# Patient Record
Sex: MALE | Race: WHITE | Employment: OTHER | URBAN - METROPOLITAN AREA
[De-identification: names, ages, dates, MRNs, and addresses within clinical notes are randomized per-mention and may not be internally consistent; named-entity substitution may affect disease eponyms.]

---

## 2017-05-05 ENCOUNTER — HOSPITAL ENCOUNTER (EMERGENCY)
Age: 37
Discharge: HOME | End: 2017-05-05
Attending: EMERGENCY MEDICINE
Payer: OTHER MISCELLANEOUS

## 2017-05-05 ENCOUNTER — APPOINTMENT (OUTPATIENT)
Dept: GENERAL RADIOLOGY | Age: 37
End: 2017-05-05
Payer: OTHER MISCELLANEOUS

## 2017-05-05 VITALS
WEIGHT: 180 LBS | DIASTOLIC BLOOD PRESSURE: 82 MMHG | SYSTOLIC BLOOD PRESSURE: 146 MMHG | HEIGHT: 72 IN | OXYGEN SATURATION: 97 % | BODY MASS INDEX: 24.38 KG/M2 | TEMPERATURE: 97 F | RESPIRATION RATE: 18 BRPM | HEART RATE: 107 BPM

## 2017-05-05 DIAGNOSIS — M25.561 ACUTE PAIN OF RIGHT KNEE: ICD-10-CM

## 2017-05-05 DIAGNOSIS — S93.104A TOE DISLOCATION, RIGHT, INITIAL ENCOUNTER: Primary | ICD-10-CM
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
## 2017-05-05 DIAGNOSIS — M54.9 UPPER BACK PAIN: ICD-10-CM

## 2017-05-05 PROCEDURE — 28495 TREAT BIG TOE FRACTURE: CPT

## 2017-05-05 PROCEDURE — 73562 X-RAY EXAM OF KNEE 3: CPT

## 2017-05-05 PROCEDURE — 72072 X-RAY EXAM THORAC SPINE 3VWS: CPT

## 2017-05-05 PROCEDURE — 73630 X-RAY EXAM OF FOOT: CPT

## 2017-05-05 PROCEDURE — 6360000002 HC RX W HCPCS

## 2017-05-05 PROCEDURE — 99284 EMERGENCY DEPT VISIT MOD MDM: CPT

## 2017-05-05 PROCEDURE — 96372 THER/PROPH/DIAG INJ SC/IM: CPT

## 2017-05-05 RX ORDER — MORPHINE SULFATE 4 MG/ML
INJECTION, SOLUTION INTRAMUSCULAR; INTRAVENOUS
Status: COMPLETED
Start: 2017-05-05 | End: 2017-05-05

## 2017-05-05 RX ORDER — CYCLOBENZAPRINE HCL 10 MG
10 TABLET ORAL 3 TIMES DAILY PRN
Qty: 30 TABLET | Refills: 0 | Status: SHIPPED | OUTPATIENT
Start: 2017-05-05 | End: 2017-05-15

## 2017-05-05 RX ORDER — MORPHINE SULFATE 4 MG/ML
4 INJECTION, SOLUTION INTRAMUSCULAR; INTRAVENOUS ONCE
Status: COMPLETED | OUTPATIENT
Start: 2017-05-05 | End: 2017-05-05

## 2017-05-05 RX ORDER — MORPHINE SULFATE 4 MG/ML
4 INJECTION, SOLUTION INTRAMUSCULAR; INTRAVENOUS ONCE
Status: DISCONTINUED | OUTPATIENT
Start: 2017-05-05 | End: 2017-05-05

## 2017-05-05 RX ORDER — OXYCODONE HYDROCHLORIDE AND ACETAMINOPHEN 5; 325 MG/1; MG/1
1 TABLET ORAL EVERY 6 HOURS PRN
Qty: 5 TABLET | Refills: 0 | Status: SHIPPED | OUTPATIENT
Start: 2017-05-05 | End: 2017-05-12

## 2017-05-05 RX ORDER — LIDOCAINE HYDROCHLORIDE 10 MG/ML
20 INJECTION, SOLUTION INFILTRATION; PERINEURAL ONCE
Status: DISCONTINUED | OUTPATIENT
Start: 2017-05-05 | End: 2017-05-06 | Stop reason: HOSPADM

## 2017-05-05 RX ADMIN — MORPHINE SULFATE 4 MG: 4 INJECTION, SOLUTION INTRAMUSCULAR; INTRAVENOUS at 21:38

## 2017-05-05 ASSESSMENT — PAIN DESCRIPTION - PAIN TYPE: TYPE: ACUTE PAIN

## 2017-05-05 ASSESSMENT — PAIN SCALES - GENERAL
PAINLEVEL_OUTOF10: 8
PAINLEVEL_OUTOF10: 9

## 2017-05-05 ASSESSMENT — ENCOUNTER SYMPTOMS
SHORTNESS OF BREATH: 0
VOMITING: 0
ABDOMINAL PAIN: 0
WHEEZING: 0
BACK PAIN: 1
NAUSEA: 0

## 2017-05-05 ASSESSMENT — PAIN DESCRIPTION - LOCATION: LOCATION: ANKLE

## 2020-06-25 ENCOUNTER — OFFICE VISIT (OUTPATIENT)
Dept: FAMILY MEDICINE CLINIC | Age: 40
End: 2020-06-25
Payer: MEDICAID

## 2020-06-25 VITALS
DIASTOLIC BLOOD PRESSURE: 73 MMHG | SYSTOLIC BLOOD PRESSURE: 110 MMHG | WEIGHT: 163 LBS | TEMPERATURE: 97.2 F | HEIGHT: 70 IN | HEART RATE: 86 BPM | BODY MASS INDEX: 23.34 KG/M2

## 2020-06-25 PROCEDURE — G8427 DOCREV CUR MEDS BY ELIG CLIN: HCPCS | Performed by: STUDENT IN AN ORGANIZED HEALTH CARE EDUCATION/TRAINING PROGRAM

## 2020-06-25 PROCEDURE — G8420 CALC BMI NORM PARAMETERS: HCPCS | Performed by: STUDENT IN AN ORGANIZED HEALTH CARE EDUCATION/TRAINING PROGRAM

## 2020-06-25 PROCEDURE — 1036F TOBACCO NON-USER: CPT | Performed by: STUDENT IN AN ORGANIZED HEALTH CARE EDUCATION/TRAINING PROGRAM

## 2020-06-25 PROCEDURE — 99204 OFFICE O/P NEW MOD 45 MIN: CPT | Performed by: STUDENT IN AN ORGANIZED HEALTH CARE EDUCATION/TRAINING PROGRAM

## 2020-06-25 ASSESSMENT — ENCOUNTER SYMPTOMS
ABDOMINAL PAIN: 0
VOMITING: 0
CHEST TIGHTNESS: 0
SHORTNESS OF BREATH: 0
SORE THROAT: 0
NAUSEA: 0
CONSTIPATION: 0
COUGH: 0
DIARRHEA: 0

## 2020-06-25 ASSESSMENT — PATIENT HEALTH QUESTIONNAIRE - PHQ9
2. FEELING DOWN, DEPRESSED OR HOPELESS: 0
SUM OF ALL RESPONSES TO PHQ9 QUESTIONS 1 & 2: 0
SUM OF ALL RESPONSES TO PHQ QUESTIONS 1-9: 0
SUM OF ALL RESPONSES TO PHQ QUESTIONS 1-9: 0
1. LITTLE INTEREST OR PLEASURE IN DOING THINGS: 0

## 2020-06-25 NOTE — PROGRESS NOTES
Attending Physician Statement  I have discussed the care of Rogerio Guerra pertinent history and exam findings,  with the resident. I have reviewed the key elements of all parts of the encounter with the resident. I agree with the assessment, plan and orders as documented by the resident.   (GE Modifier)    NP to establish care  Sleep disorder- Sleep medicine referral/sleep study Columba Loser diary
Visit Information    Have you changed or started any medications since your last visit including any over-the-counter medicines, vitamins, or herbal medicines? no   Have you stopped taking any of your medications? Is so, why? -  no  Are you having any side effects from any of your medications? - no    Have you seen any other physician or provider since your last visit?  no   Have you had any other diagnostic tests since your last visit?  no   Have you been seen in the emergency room and/or had an admission in a hospital since we last saw you?  no   Have you had your routine dental cleaning in the past 6 months?  no     Do you have an active MyChart account? If no, what is the barrier?   No:     Patient Care Team:  Nancy Chanel MD as PCP - General (Family Medicine)    Medical History Review  Past Medical, Family, and Social History reviewed and does not contribute to the patient presenting condition    Health Maintenance   Topic Date Due    Varicella vaccine (1 of 2 - 2-dose childhood series) 03/03/1982    HIV screen  03/03/1996    DTaP/Tdap/Td vaccine (1 - Tdap) 03/03/2000    Flu vaccine (Season Ended) 09/01/2020    Hepatitis A vaccine  Aged Out    Hepatitis B vaccine  Aged Out    Hib vaccine  Aged Out    Meningococcal (ACWY) vaccine  Aged Out    Pneumococcal 0-64 years Vaccine  Aged Out
Autism spectrum  -Patient not on medications          Requested Prescriptions      No prescriptions requested or ordered in this encounter       There are no discontinued medications. Return in about 4 weeks (around 7/23/2020). Afshan Heredia received counseling on the following healthy behaviors: nutrition and exercise  Reviewed prior labs and health maintenance  Continue current medications, diet and exercise. Discussed use, benefit, and side effects of prescribed medications. Barriers to medication compliance addressed. Patient given educational materials - see patient instructions  Was a self-tracking handout given in paper form or via App[employer]? No:     Requested Prescriptions      No prescriptions requested or ordered in this encounter       All patient questions answered. Patient voiced understanding. Quality Measures    Body mass index is 23.39 kg/m². Elevated. Weight control planned discussed Healthy diet and regular exercise. BP: 110/73 Blood pressure is normal. Treatment plan consists of No treatment change needed.     No results found for: LDLCALC, LDLCHOLESTEROL, LDLDIRECT (goal LDL reduction with dx if diabetes is 50% LDL reduction)      PHQ Scores 6/25/2020   PHQ2 Score 0   PHQ9 Score 0     Interpretation of Total Score Depression Severity: 1-4 = Minimal depression, 5-9 = Mild depression, 10-14 = Moderate depression, 15-19 = Moderately severe depression, 20-27 = Severe depression

## 2020-09-11 ENCOUNTER — OFFICE VISIT (OUTPATIENT)
Dept: FAMILY MEDICINE CLINIC | Age: 40
End: 2020-09-11
Payer: MEDICAID

## 2020-09-11 VITALS
BODY MASS INDEX: 22.9 KG/M2 | HEART RATE: 82 BPM | TEMPERATURE: 97.1 F | WEIGHT: 160 LBS | DIASTOLIC BLOOD PRESSURE: 77 MMHG | HEIGHT: 70 IN | SYSTOLIC BLOOD PRESSURE: 123 MMHG

## 2020-09-11 PROCEDURE — G8420 CALC BMI NORM PARAMETERS: HCPCS | Performed by: STUDENT IN AN ORGANIZED HEALTH CARE EDUCATION/TRAINING PROGRAM

## 2020-09-11 PROCEDURE — 1036F TOBACCO NON-USER: CPT | Performed by: STUDENT IN AN ORGANIZED HEALTH CARE EDUCATION/TRAINING PROGRAM

## 2020-09-11 PROCEDURE — 99213 OFFICE O/P EST LOW 20 MIN: CPT | Performed by: STUDENT IN AN ORGANIZED HEALTH CARE EDUCATION/TRAINING PROGRAM

## 2020-09-11 PROCEDURE — G8427 DOCREV CUR MEDS BY ELIG CLIN: HCPCS | Performed by: STUDENT IN AN ORGANIZED HEALTH CARE EDUCATION/TRAINING PROGRAM

## 2020-09-11 ASSESSMENT — ENCOUNTER SYMPTOMS
CONSTIPATION: 0
CHEST TIGHTNESS: 0
DIARRHEA: 0
ABDOMINAL PAIN: 0
COUGH: 0
SORE THROAT: 0
SHORTNESS OF BREATH: 0
NAUSEA: 0
VOMITING: 0

## 2020-09-11 NOTE — PROGRESS NOTES
Subjective: Francisco Carrillo is a 45 y.o. male with  has a past medical history of Asthma. No family history on file. Presented tothe office today for:  Chief Complaint   Patient presents with    Shoulder Pain       HPI     Location: left knee pain   Intensity: 5/10  Quality: dull, achy  Radiation:None   Alleviating: knee wraps  Aggravating: walking  Associated: None  Treatments: compression wraps and ice      Location: low back   Intensity: 7/10  Quality: sharp  Radiation:none  Alleviating: position and sleeping on a seated chair  Aggravating: melatonin  Associated: none  Treatments: aleve and heating pad      Review of Systems   Constitutional: Negative for chills, fatigue, fever and unexpected weight change. HENT: Negative for congestion, mouth sores and sore throat. Eyes: Negative for visual disturbance. Respiratory: Negative for cough, chest tightness and shortness of breath. Cardiovascular: Negative for chest pain and leg swelling. Gastrointestinal: Negative for abdominal pain, constipation, diarrhea, nausea and vomiting. Genitourinary: Negative for difficulty urinating. Musculoskeletal: Negative for joint swelling. Knee pain   Skin: Negative for rash. Neurological: Negative for dizziness, weakness and headaches. Objective:    /77 (Site: Right Upper Arm, Position: Sitting, Cuff Size: Medium Adult)   Pulse 82   Temp 97.1 °F (36.2 °C) (Temporal)   Ht 5' 10\" (1.778 m)   Wt 160 lb (72.6 kg)   BMI 22.96 kg/m²    BP Readings from Last 3 Encounters:   09/11/20 123/77   06/25/20 110/73   05/05/17 (!) 146/82     Physical Exam  Vitals signs and nursing note reviewed. Constitutional:       Appearance: He is well-developed. Cardiovascular:      Rate and Rhythm: Normal rate and regular rhythm. Heart sounds: Normal heart sounds. No murmur. No friction rub. No gallop. Pulmonary:      Effort: Pulmonary effort is normal. No respiratory distress.       Breath sounds: Normal breath sounds. No wheezing or rales. Chest:      Chest wall: No tenderness. Abdominal:      General: Bowel sounds are normal. There is no distension. Palpations: Abdomen is soft. There is no mass. Tenderness: There is no abdominal tenderness. There is no guarding. Musculoskeletal:      Comments:   Knee Exam  Musculoskeletal/Neurologic:  Inspection-Swelling: none, Ecchymosis: no  Palpation-Tenderness:none  Pain with patellar grind: no  ROM-normal  Strength- WNL  Sensation-normal to light touch    Special Tests-  Varus Laxity: negative   Valgus Laxity:  negative   Anterior Drawer: negative   Posterior Drawer: negative  Lachman's: negative  Adali's:negative  Thessaly: negative    Single Leg Squat: Negative  Deep Squat: Negative  Gait: normal    PSYCH:  Good fund of knowledge and displays understanding of exam.      Palpation - Tenderness: no  ROM - normal  Strength- WNL  Sensation -WNL  Reflexes - WNL  SLR: negative  Randy: negative  Leg length discrepancy: none    Gait: normal    PSYCH:  Good fund of knowledge and displays understanding of exam.   Skin:     Capillary Refill: Capillary refill takes less than 2 seconds. Neurological:      Mental Status: He is alert and oriented to person, place, and time. No results found for: WBC, HGB, HCT, PLT, CHOL, TRIG, HDL, LDLDIRECT, ALT, AST, NA, K, CL, CREATININE, BUN, CO2, TSH, PSA, INR, GLUF, LABA1C, LABMICR  No results found for: CALCIUM, PHOS  No results found for: LDLCALC, LDLCHOLESTEROL, LDLDIRECT    Assessment and Plan:    1. Chronic pain of left knee  - [facility]    2. Acute low back pain without sciatica, unspecified back pain laterality  -[facility]      Requested Prescriptions      No prescriptions requested or ordered in this encounter       There are no discontinued medications. Return in about 3 months (around 12/11/2020).       Rita Blair received counseling on the

## 2020-09-24 ENCOUNTER — HOSPITAL ENCOUNTER (OUTPATIENT)
Dept: PHYSICAL THERAPY | Age: 40
Setting detail: THERAPIES SERIES
Discharge: HOME | End: 2020-09-24
Payer: MEDICAID

## 2020-09-24 PROCEDURE — 97163 PT EVAL HIGH COMPLEX 45 MIN: CPT
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
## 2020-09-28 NOTE — PRE-CERTIFICATION NOTE
[x] [facility] - [facility] &  Therapy  [address]  [phone]  F: [phone] [] [address]  [facility]   [address]  P: [phone]  F: [phone] [] [place]  [address]  P: [phone]  F: [phone] [] [address]  [facility]   [address]   [place]: [phone]  F: [phone]  [x] Anabel Tejeda   Outpatient Occupational Therapy  [address]: [phone]  F: [phone]          Therapy Pre-certification Note      9/28/2020    Bonnie Conde [facility]  1980   [id number]      Insurance approval was received for Physical Therapy from [facility] on 9/28/20. Approval was received for 72 units = 18 visits, from 10/1/20 to 12/30/20. Authorization number [id number].    [id number]  [id number]  [id number]  [id number]  [id number]  [id number]  [id number]    72 units for all = 18 visits. 10/1/20-12/30/20. Patient was contacted to be scheduled and is coming 9/29/20 per his request.    Mackenzie Anton left message for Wadell Racer at [phone] to request updated auth dates. Update: approval to change auth date to begin on 9/29/20 was received at 1:52pm by Dyeanira.       Electronically signed by Mackenzie Lozano PT on 9/28/2020 at 1:46 PM

## 2020-09-29 ENCOUNTER — HOSPITAL ENCOUNTER (OUTPATIENT)
Dept: PHYSICAL THERAPY | Age: 40
Setting detail: THERAPIES SERIES
Discharge: HOME | End: 2020-09-29
Payer: MEDICAID

## 2020-09-29 PROCEDURE — 97110 THERAPEUTIC EXERCISES: CPT

## 2020-09-29 NOTE — FLOWSHEET NOTE
5/5  4. ? Function:  UEFI 6% loss of UE function, LEFS 24% loss of LE function  5. Pt report no pain w/ascending, descending steps     Patient goals: No pain, no difficulty w/steps    Pt. Education:  [x] Yes  [] No  [] Reviewed Prior HEP/Ed  Method of Education: [] Verbal  [x] Demo  [] Written  Comprehension of Education:  [x] Verbalizes understanding. [] Demonstrates understanding. [x] Needs review. [] Demonstrates/verbalizes HEP/Ed previously given. Plan: [x] Continue current frequency toward long and short term goals.     [x] Specific Instructions for subsequent treatments:  quad strengthening, scap stab exercises, add game ready knee to decrease pain and edema, OK for ionto or US to decrease inflammation, consider taping         Time In: 1500            Time Out: [address] [facility]      Electronically signed by:  Gabo Woodard PTA

## 2020-09-30 ENCOUNTER — HOSPITAL ENCOUNTER (OUTPATIENT)
Dept: PHYSICAL THERAPY | Age: 40
Setting detail: THERAPIES SERIES
Discharge: HOME | End: 2020-09-30
Payer: MEDICAID

## 2020-09-30 PROCEDURE — 97110 THERAPEUTIC EXERCISES: CPT

## 2020-09-30 NOTE — FLOWSHEET NOTE
[x] [facility] &  Therapy  [address]  [phone]  F: [phone]     Physical Therapy Daily Treatment Note    Date:  2020  Patient Name:  Carie Owens      :  1980    MRN: [id number]  Physician: Warren Rebolledo, Irma Solis, Kayla Becerra                             Insurance: [facility]   Approval was received for 72 units = 18 visits, from 10/1/20 to 20. Authorization number [id number].   [id number]  [id number]  [id number]  [id number]  [id number]  [id number]  [id number]  Medical Diagnosis: Winged L scapula M95.8, Chronic pain L knee M25.562, G89.29                    Rehab Codes: W16.591, M25.562, M25.612, M25.662, M25.462,M54.5, R29.3, M62.51  Onset Date: 2019                                   Next 's appt: unknown    Visit# / total visits: 3/18     Cancels/No Shows: 0/0    Subjective:    Pain:  [x] Yes  [] No Location: L scapula, L lateral knee   Pain Rating: (0-10 scale) 3/10 -- L scap  Pain altered Tx:  [x] No  [] Yes  Action:  Comments: Patient states with usual scapula pain this date at arrival. Notes no increased pain or soreness in LE from last session.       Objective:  Modalities: ionto, vaso prn  Precautions:  Exercises:   Exercise Reps/ Time Weight/ Level Comments   SciFit prn  Pt rides bicycle to clinic         Pulleys DC  Flexion/abductoin -          Standing      Bands: increase band next TX   Increased reps    - Pull Down 20x Lime [place]    - Rows 20x Lime [place]    - Protraction 20x Lime [place]    - Horizontal abduction 20x [place] Bilaterally - simultaneously   - ER 20x Lime Green Bilaterally - simultaneously         Scapular Stabilization on Wall 15x 2.2 lb Up/down, side/side, CW/CCW  Ensure only scapular motion - no distal chain activity         Gastroc stretch 3x30\"     HS stretch 3x30\"  Doni   Power step Ups 20x 12\" Drive opp knee up    Heel Taps 20x 4\" Fwd   TKE 20x5\" Plum    3 way hip  15x Blue           Seated   Doni   LAQ 20x 2 lb    HS Curls 20x 2 lb  Lime Green          Supine   Doni   SAQ 20x 2 lb    SLR 20x 2 lb    Piriformis stretch 3x20\"  Figure 4 supine and seated         Prone   Next   Hip ext      Shoulder rows      Shoulder ext       Other:    Manual: - add** foam rolling to lateral thigh/IT band; manual or self piriformis stretching     Instructions for Next Tx:  quad strengthening, scap stab exercises, add game ready knee to decrease pain and edema, OK for ionto to decrease inflammation, consider taping      Treatment Charges: Mins Units   []  Modalities     [x]  Ther Exercise 45 3   []  Manual Therapy     []  Ther Activities     []  Aquatics     []  Vasocompression     []  Other     Total Treatment time 45 3       Assessment: [x] Progressing toward goals: Added posterior chain stretches as patient notes onset of minimal fatigue and low back pain during standing exs, relief post stretching. Modified step ups to power step ups to improved doni LE strength and stability with patient noting no pain in knee or issues. Patient with increased tension in hips noted at last treatment, added piriformis stretches in seated and supine to address this date. Patient verbalized adequate stretch in appropriate musculature. Issued written handout and lime tband for supine exs, 3 way hip, and piriformis stretches this date with good verbal understanding for use. [] No change. [] Other:  [x] Patient would benefit from skilled physical therapy services in order to: decrease pain, increase strength and ROM, and increase functional mobility, including tolerance to steps. STG: (to be met in 10 treatments)  1. ? Pain: L shoulder, L knee 2/10 average pain, 7/10 at worst  2. ? ROM: L knee ext 5°; L Shoulder flex 166°, IR 76° (@ 90° abd)  3. ? Strength: L shoulder 4-/5, L hip 4-/5, L knee flex 4+/5, ext 5/5  4. ? Function:  UEFI 10% loss of UE function, LEFS 36% loss of LE function  5. Pt report knee no longer feel weak when walking  6.  Pt report decreased frequency of knee edema  7. Patient to be independent with home exercise program as demonstrated by performance with correct form without cues.     LTG: (to be met in 18 treatments)  1. ? Pain: L shoulder, L knee 0/10 average pain, 4/10 at worst  2. ? ROM: L knee ext 2°; L Shoulder IR 82° (@ 90° abd)  3. ? Strength: L shoulder 4/5, L hip 4/5, L knee 5/5  4. ? Function:  UEFI 6% loss of UE function, LEFS 24% loss of LE function  5. Pt report no pain w/ascending, descending steps     Patient goals: No pain, no difficulty w/steps    Pt. Education:  [x] Yes  [] No  [] Reviewed Prior HEP/Ed  Method of Education: [] Verbal  [x] Demo  [x] Written  Comprehension of Education:  [x] Verbalizes understanding. [] Demonstrates understanding. [x] Needs review. [] Demonstrates/verbalizes HEP/Ed previously given. 9/30/20: 3 way hip (and lime tband), piriformis stretches, SLR, SAQ, LAQ     Plan: [x] Continue current frequency toward long and short term goals.     [x] Specific Instructions for subsequent treatments:  quad strengthening, scap stab exercises, add game ready knee to decrease pain and edema, OK for ionto or US to decrease inflammation, consider taping         Time In: 345 pm            Time Out: 450 pm      Electronically signed by:  Rene Baird PTA

## 2020-10-05 ENCOUNTER — APPOINTMENT (OUTPATIENT)
Dept: PHYSICAL THERAPY | Age: 40
End: 2020-10-05
Payer: MEDICAID

## 2020-10-07 ENCOUNTER — APPOINTMENT (OUTPATIENT)
Dept: PHYSICAL THERAPY | Age: 40
End: 2020-10-07
Payer: MEDICAID

## 2020-10-09 ENCOUNTER — HOSPITAL ENCOUNTER (OUTPATIENT)
Dept: PHYSICAL THERAPY | Age: 40
Setting detail: THERAPIES SERIES
Discharge: HOME | End: 2020-10-09
Payer: MEDICAID

## 2020-10-09 PROCEDURE — 97110 THERAPEUTIC EXERCISES: CPT

## 2020-10-09 NOTE — FLOWSHEET NOTE
[x] [facility] &  Therapy  [address]  [phone]  F: [phone]     Physical Therapy Daily Treatment Note    Date:  10/9/2020  Patient Name:  Cesilia Boswell      :  1980    MRN: [id number]  Physician: Elise Goddard, Eboni Subramanian, Cher Oakes                             Insurance: [facility]   Approval was received for 72 units = 18 visits, from 10/1/20 to 20. Authorization number [id number].   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  Reports no pain in knee upon arrival.      Objective:  Modalities: ionto, vaso prn  Precautions:  Exercises:   Exercise Reps/ Time Weight/ Level Comments   SciFit prn  Pt rides bicycle to clinic         Pulleys DC  Flexion/abductoin -          Standing      Bands: increase band next TX   Increased reps    - Pull Down 20x Lime Green    - Rows 20x Blueberry    - Protraction 20x Plum    - Horizontal abduction 20x Blueberry Bilaterally - simultaneously   - ER 20x Blueberry Bilaterally - simultaneously         Scapular Stabilization on Wall 15x 2.2 lb Up/down, side/side, CW/CCW  Ensure only scapular motion - no distal chain activity         Gastroc stretch 3x30\"     HS stretch 3x30\"  Doni   Power step Ups 20x 12\" Drive opp knee up    Heel Taps 20x 4\" Fwd   TKE 20x5\" Plum    3 way hip  15x Blue           Seated   Doni   LAQ 20x 2 lb    HS Curls 20x 2 lb  Lime Green Supine   Doni   SAQ 20x 2 lb    SLR 20x 2 lb    Piriformis stretch 3x20\"  Figure 4 supine and seated         Prone on TG      Flexion 15x Add weight    Horizontal  15x Add weight    Extension 15x 2 lb    Rows - Wide  15x 2 lb Elbows away from sides         Other:    Manual: - add** foam rolling to lateral thigh/IT band; manual or self piriformis stretching     Instructions for Next Tx:  quad strengthening, scap stab exercises, add game ready knee to decrease pain and edema, OK for ionto to decrease inflammation, consider taping      Treatment Charges: Mins Units   []  Modalities     [x]  Ther Exercise 45 3   []  Manual Therapy     []  Ther Activities     []  Aquatics     []  Vasocompression     []  Other     Total Treatment time 45 3       Assessment: [x] Progressing toward goals: added prone scapular ex's to improve scapular strength in posterior shoulder. Fair tolerance with this. Added planks as well for general core strength and scapular stabilization. Weakness noted with this and pt educated to incorporate these into his daily calisthenics he does independently. [] No change. [] Other:  [x] Patient would benefit from skilled physical therapy services in order to: decrease pain, increase strength and ROM, and increase functional mobility, including tolerance to steps. STG: (to be met in 10 treatments)  1. ? Pain: L shoulder, L knee 2/10 average pain, 7/10 at worst  2. ? ROM: L knee ext 5°; L Shoulder flex 166°, IR 76° (@ 90° abd)  3. ? Strength: L shoulder 4-/5, L hip 4-/5, L knee flex 4+/5, ext 5/5  4. ? Function:  UEFI 10% loss of UE function, LEFS 36% loss of LE function  5. Pt report knee no longer feel weak when walking  6. Pt report decreased frequency of knee edema  7.  Patient to be independent with home exercise program as demonstrated by performance with correct form without cues.     LTG: (to be met in 18 treatments)  1. ? Pain: L shoulder, L knee 0/10 average pain, 4/10 at

## 2020-10-12 ENCOUNTER — HOSPITAL ENCOUNTER (OUTPATIENT)
Dept: PHYSICAL THERAPY | Age: 40
Setting detail: THERAPIES SERIES
Discharge: HOME | End: 2020-10-12
Payer: MEDICAID

## 2020-10-12 PROCEDURE — 97110 THERAPEUTIC EXERCISES: CPT

## 2020-10-12 PROCEDURE — 97140 MANUAL THERAPY 1/> REGIONS: CPT

## 2020-10-12 NOTE — FLOWSHEET NOTE
[x] [facility] &  Therapy  [address]  [phone]  F: [phone]     Physical Therapy Daily Treatment Note    Date:  10/12/2020  Patient Name:  Dom Morales      :  1980    MRN: [id number]  Physician: Bobbi Basilio Francia Hose                             Insurance: [facility]   Approval was received for 72 units = 18 visits, from 10/1/20 to 20. Authorization number [id number].  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 Notes riding his Tricycle here again with no issues.       Objective:  Modalities: ionto, vaso prn  Precautions:  Exercises:   Exercise Reps/ Time Weight/ Level Comments   SciFit prn  Pt rides bicycle to clinic         Standing      Bands: increase band next TX   Increased reps    - Pull Down 20x Lime Green    - Rows 20x Blueberry    - Protraction 20x Plum    - Horizontal abduction 20x Blueberry Bilaterally - simultaneously   - ER 20x Blueberry Bilaterally - simultaneously         Scapular Stabilization on Wall 15x 2.2 lb Up/down, side/side, CW/CCW  Ensure only scapular motion - no distal chain activity         Gastroc stretch 3x30\"     HS stretch 3x30\"  Doni   Power step Ups 20x 12\" Drive opp knee up    Heel Taps HOLD  Hold - unable to perform due to poor ankle mobility   TKE 20x5\" [place]    3 way hip  15x Blue     Lunges 15x ea           Seated   Doni   LAQ 20x 2 lb longer feel weak when walking  6. Pt report decreased frequency of knee edema  7. Patient to be independent with home exercise program as demonstrated by performance with correct form without cues.     LTG: (to be met in 18 treatments)  1. ? Pain: L shoulder, L knee 0/10 average pain, 4/10 at worst  2. ? ROM: L knee ext 2°; L Shoulder IR 82° (@ 90° abd)  3. ? Strength: L shoulder 4/5, L hip 4/5, L knee 5/5  4. ? Function:  UEFI 6% loss of UE function, LEFS 24% loss of LE function  5. Pt report no pain w/ascending, descending steps     Patient goals: No pain, no difficulty w/steps    Pt. Education:  [x] Yes  [] No  [] Reviewed Prior HEP/Ed  Method of Education: [] Verbal  [x] Demo  [x] Written  Comprehension of Education:  [x] Verbalizes understanding. [] Demonstrates understanding. [x] Needs review. [] Demonstrates/verbalizes HEP/Ed previously given. 9/30/20: 3 way hip (and lime tband), piriformis stretches, SLR, SAQ, LAQ     Plan: [x] Continue current frequency toward long and short term goals.     [x] Specific Instructions for subsequent treatments:  quad strengthening, scap stab exercises, add game ready knee to decrease pain and edema, OK for ionto or US to decrease inflammation, consider taping         Time In: 0907             Time Out: 1010      Electronically signed by:  Mervat Lacy PTA

## 2020-10-14 ENCOUNTER — APPOINTMENT (OUTPATIENT)
Dept: PHYSICAL THERAPY | Age: 40
End: 2020-10-14
Payer: MEDICAID

## 2020-10-14 ENCOUNTER — HOSPITAL ENCOUNTER (OUTPATIENT)
Dept: PHYSICAL THERAPY | Age: 40
Setting detail: THERAPIES SERIES
Discharge: HOME | End: 2020-10-14
Payer: MEDICAID

## 2020-10-14 PROCEDURE — 97110 THERAPEUTIC EXERCISES: CPT

## 2020-10-14 PROCEDURE — 97140 MANUAL THERAPY 1/> REGIONS: CPT

## 2020-10-14 NOTE — FLOWSHEET NOTE
[x] [facility]) [facility] &  Therapy  [address]  [phone]  F: [phone]     Physical Therapy Daily Treatment Note    Date:  10/14/2020  Patient Name:  Sim Lara      :  1980    MRN: [id number]  Physician: Lindsey Trevino Estanislao Cuba                             Insurance: [facility]   Approval was received for 72 units = 18 visits, from 10/1/20 to 20. Authorization number [id number].  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 States \"however, we only spent 15-20 minutes on my arms last time so we can flip that today\".        Objective:  Modalities: ionto, vaso prn  Precautions:  Exercises:   Exercise Reps/ Time Weight/ Level Comments    SciFit prn  Pt rides bicycle to clinic           Standing       Bands: increase band next TX   Increased reps     - Pull Down 20x Lime Green  x   - Rows 20x Blueberry  x   - Protraction 20x Plum  x   - Horizontal abduction 20x Blueberry Bilaterally - simultaneously x   - ER 20x Blueberry Bilaterally - simultaneously x   Doorway ER stretch 3x15\"  Doni x   Corner stretch 3x20\"   x   Scapular Stabilization on Wall 15x 2.2 lb Up/down, side/side, CW/CCW  Ensure only scapular motion - no distal chain activity x   Wall push ups 2x15   x   Reverse wall push up 2x15   x   FW flexion 2x10 2lb Scap retraction x fair carryover. Patient requested \"the stick\" rolling to quads and adductors at conclusion of treatment as significant tension present. Fair release and moderate relief noted post.         [] No change. [] Other:  [x] Patient would benefit from skilled physical therapy services in order to: decrease pain, increase strength and ROM, and increase functional mobility, including tolerance to steps. STG: (to be met in 10 treatments)  1. ? Pain: L shoulder, L knee 2/10 average pain, 7/10 at worst  2. ? ROM: L knee ext 5°; L Shoulder flex 166°, IR 76° (@ 90° abd)  3. ? Strength: L shoulder 4-/5, L hip 4-/5, L knee flex 4+/5, ext 5/5  4. ? Function:  UEFI 10% loss of UE function, LEFS 36% loss of LE function  5. Pt report knee no longer feel weak when walking  6. Pt report decreased frequency of knee edema  7. Patient to be independent with home exercise program as demonstrated by performance with correct form without cues.     LTG: (to be met in 18 treatments)  1. ? Pain: L shoulder, L knee 0/10 average pain, 4/10 at worst  2. ? ROM: L knee ext 2°; L Shoulder IR 82° (@ 90° abd)  3. ? Strength: L shoulder 4/5, L hip 4/5, L knee 5/5  4. ? Function:  UEFI 6% loss of UE function, LEFS 24% loss of LE function  5. Pt report no pain w/ascending, descending steps     Patient goals: No pain, no difficulty w/steps    Pt. Education:  [x] Yes  [] No  [] Reviewed Prior HEP/Ed  Method of Education: [] Verbal  [x] Demo  [x] Written  Comprehension of Education:  [x] Verbalizes understanding. [] Demonstrates understanding. [x] Needs review. [] Demonstrates/verbalizes HEP/Ed previously given. 9/30/20: 3 way hip (and lime tband), piriformis stretches, SLR, SAQ, LAQ     Plan: [x] Continue current frequency toward long and short term goals.     [x] Specific Instructions for subsequent treatments:  quad strengthening, scap stab exercises, add game ready knee to decrease pain and edema, OK for ionto or US to decrease inflammation, consider taping         Time In: 5952             Time Out: 1005 am      Electronically signed by:  Marian Izaguirre PTA

## 2020-10-16 ENCOUNTER — APPOINTMENT (OUTPATIENT)
Dept: PHYSICAL THERAPY | Age: 40
End: 2020-10-16
Payer: MEDICAID

## 2020-10-19 ENCOUNTER — HOSPITAL ENCOUNTER (OUTPATIENT)
Dept: PHYSICAL THERAPY | Age: 40
Setting detail: THERAPIES SERIES
Discharge: HOME | End: 2020-10-19
Payer: MEDICAID

## 2020-10-19 PROCEDURE — 97110 THERAPEUTIC EXERCISES: CPT

## 2020-10-19 NOTE — FLOWSHEET NOTE
[x] [facility] &  Therapy  [address]  [phone]  F: [phone]     Physical Therapy Daily Treatment Note    Date:  10/19/2020  Patient Name:  Carie Owens      :  1980    MRN: [id number]  Physician: Warren Rebolledo, Irma Solis, Kayla Becerra                             Insurance: [facility]   Approval was received for 72 units = 18 visits, from 10/1/20 to 20. Authorization number [id number].  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 Feels he strained L shoulder blade from moving jugs of milk. 8/10 pain noted last night, with no pain rating given.       Objective:  Modalities: ionto, vaso prn  Precautions:  Exercises completed marked with \"X\"   Exercise Reps/ Time Weight/ Level Comments    SciFit prn  Pt rides bicycle to clinic           Standing       Bands: increase band next TX   Increased reps     - Over Head Wide  Row 20x Blueberry  X   - Rows 20x Blueberry  X   - Protraction 20x Plum  X   - Horizontal abduction 20x Blueberry Bilaterally - simultaneously X   - ER 20x Blueberry Bilaterally - simultaneously X   Doorway ER stretch 3x15\"  Doni    Corner stretch 3x20\"   X   Scapular Stabilization on Wall 15x 2.2 lb Up/down, side/side, CW/CCW  Ensure only scapular motion - no distal chain activity X   Wall push ups 2x15   X   Reverse wall push up 2x15      FW flexion 2x10 3 lb Scap retraction  Increased weight states about 7 weeks ago he fell while in a hurry and injured his elbow and wrist, but now is isolated to her radial wrist and thumb. Had pt perform [facility] [place], which was positive. Educated pt on possibility of DeQuervain Symptom and issued educational written hand out: https://orthoinfo. aaos. org/en/diseases--conditions/oe-udozdehgb-krqwmqblpw/      [x] Patient would benefit from skilled physical therapy services in order to: decrease pain, increase strength and ROM, and increase functional mobility, including tolerance to steps. STG: (to be met in 10 treatments)  1. ? Pain: L shoulder, L knee 2/10 average pain, 7/10 at worst  2. ? ROM: L knee ext 5°; L Shoulder flex 166°, IR 76° (@ 90° abd)  3. ? Strength: L shoulder 4-/5, L hip 4-/5, L knee flex 4+/5, ext 5/5  4. ? Function:  UEFI 10% loss of UE function, LEFS 36% loss of LE function  5. Pt report knee no longer feel weak when walking  6. Pt report decreased frequency of knee edema  7. Patient to be independent with home exercise program as demonstrated by performance with correct form without cues.     LTG: (to be met in 18 treatments)  1. ? Pain: L shoulder, L knee 0/10 average pain, 4/10 at worst  2. ? ROM: L knee ext 2°; L Shoulder IR 82° (@ 90° abd)  3. ? Strength: L shoulder 4/5, L hip 4/5, L knee 5/5  4. ? Function:  UEFI 6% loss of UE function, LEFS 24% loss of LE function  5. Pt report no pain w/ascending, descending steps     Patient goals: No pain, no difficulty w/steps    Pt. Education:  [x] Yes  [] No  [] Reviewed Prior HEP/Ed  Method of Education: [] Verbal  [x] Demo  [x] Written  Comprehension of Education:  [x] Verbalizes understanding. [] Demonstrates understanding. [x] Needs review. [] Demonstrates/verbalizes HEP/Ed previously given. 9/30/20: 3 way hip (and lime tband), piriformis stretches, SLR, SAQ, LAQ  https://orthoinfo. aaos. org/en/diseases--conditions/qo-pmgmdukkz-bommfshgso/     Plan: [x] Continue current frequency toward long and short term goals.     [x] Specific Instructions for subsequent treatments:  quad strengthening, scap stab exercises, add game ready knee to decrease pain and edema, OK for ionto or US to decrease inflammation, consider taping         Time In: 1110             Time Out: 1212      Electronically signed by:  Yris Dubon PTA

## 2020-10-21 ENCOUNTER — APPOINTMENT (OUTPATIENT)
Dept: PHYSICAL THERAPY | Age: 40
End: 2020-10-21
Payer: MEDICAID

## 2020-10-23 ENCOUNTER — HOSPITAL ENCOUNTER (OUTPATIENT)
Dept: PHYSICAL THERAPY | Age: 40
Setting detail: THERAPIES SERIES
Discharge: HOME | End: 2020-10-23
Payer: MEDICAID

## 2020-10-23 NOTE — FLOWSHEET NOTE
[x] [facility] &  Therapy  [address]    [phone]  F: [phone]     Physical Therapy Cancel/No Show note    Date: 10/23/2020  Patient: Dominic Epstein  : 1980  MRN: [id number]    Cancels/No Shows to date:   For today's appointment patient:    [x]  Cancelled    [] Rescheduled appointment    [] No-show     Reason given by patient:    []  Patient ill    []  Conflicting appointment    [] No transportation      [] Conflict with work    [] No reason given    [] Weather related    [] COVID-19    [x] Other:      Comments:  Pt cancelled 2 days in advance, but was not taken off schedule. cx not counted against pt this date.       [x] Next appt confirmed    Electronically signed by: Sandra Morales PTA

## 2020-10-26 ENCOUNTER — HOSPITAL ENCOUNTER (OUTPATIENT)
Dept: PHYSICAL THERAPY | Age: 40
Setting detail: THERAPIES SERIES
Discharge: HOME | End: 2020-10-26
Payer: MEDICAID

## 2020-10-26 PROCEDURE — 97110 THERAPEUTIC EXERCISES: CPT

## 2020-10-26 NOTE — FLOWSHEET NOTE
[x] [facility] &  Therapy  [address]  [phone]  F: [phone]     Physical Therapy Daily Treatment Note    Date:  10/26/2020  Patient Name:  Wylie Dancer      :  1980    MRN: [id number]  Physician: Jennifer Mojica Selmer Searing                             Insurance: [facility]   Approval was received for 72 units = 18 visits, from 10/1/20 to 20. Authorization number [id number].  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      Objective:  Modalities: ionto, vaso prn  Precautions:  Exercises completed marked with \"X\"   Exercise Reps/ Time Weight/ Level Comments    SciFit prn  Pt rides bicycle to clinic           Standing       Bands:    Increased reps   Progressed resistance 10/26    - Over Head Wide  Row 20x Plum  X   - Rows 20x Plum  X   - Protraction 20x Plum  X   - Horizontal abduction 20x Plum  Bilaterally - simultaneously X   - ER 20x Plum Bilaterally - simultaneously X   Doorway ER stretch 3x15\"  Doni    Corner stretch 3x20\"   X   Scapular Stabilization on Wall 15x 2.2 lb Up/down, side/side, CW/CCW  Ensure only scapular motion - no distal chain activity X   Wall push ups 2x15   X   Reverse wall push up 2x15   x   FW flexion 2x15 3 lb Scap retraction  Increased weight 10/19/20  Increased reps 10/26/20 X   FW abduction  2x15 3 lb Scap retraction  Increased weight 10/19/20  Increased reps mobility, including tolerance to steps. STG: (to be met in 10 treatments)  1. ? Pain: L shoulder, L knee 2/10 average pain, 7/10 at worst  2. ? ROM: L knee ext 5°; L Shoulder flex 166°, IR 76° (@ 90° abd)  3. ? Strength: L shoulder 4-/5, L hip 4-/5, L knee flex 4+/5, ext 5/5  4. ? Function:  UEFI 10% loss of UE function, LEFS 36% loss of LE function  5. Pt report knee no longer feel weak when walking  6. Pt report decreased frequency of knee edema  7. Patient to be independent with home exercise program as demonstrated by performance with correct form without cues.     LTG: (to be met in 18 treatments)  1. ? Pain: L shoulder, L knee 0/10 average pain, 4/10 at worst  2. ? ROM: L knee ext 2°; L Shoulder IR 82° (@ 90° abd)  3. ? Strength: L shoulder 4/5, L hip 4/5, L knee 5/5  4. ? Function:  UEFI 6% loss of UE function, LEFS 24% loss of LE function  5. Pt report no pain w/ascending, descending steps     Patient goals: No pain, no difficulty w/steps    Pt. Education:  [x] Yes  [] No  [] Reviewed Prior HEP/Ed  Method of Education: [] Verbal  [x] Demo  [] Written  Comprehension of Education:  [x] Verbalizes understanding. [] Demonstrates understanding. [x] Needs review. [] Demonstrates/verbalizes HEP/Ed previously given. 9/30/20: 3 way hip (and lime tband), piriformis stretches, SLR, SAQ, LAQ  https://orthoinfo. aaos. org/en/diseases--conditions/mo-adqonuxlg-hzptvcfnys/     Plan: [x] Continue current frequency toward long and short term goals.     [x] Specific Instructions for subsequent treatments:  quad strengthening, scap stab exercises, add game ready knee to decrease pain and edema, OK for ionto or US to decrease inflammation, consider taping         Time In: 4:55 pm            Time Out: 5:55 pm      Electronically signed by:  Steven Morales, PTA

## 2020-11-03 ENCOUNTER — HOSPITAL ENCOUNTER (OUTPATIENT)
Dept: PHYSICAL THERAPY | Age: 40
Setting detail: THERAPIES SERIES
Discharge: HOME | End: 2020-11-03
Payer: MEDICAID

## 2020-11-03 PROCEDURE — 97110 THERAPEUTIC EXERCISES: CPT

## 2020-11-03 NOTE — FLOWSHEET NOTE
[x] [facility] &  Therapy  [address]  [phone]  F: [phone]     Physical Therapy Daily Treatment Note    Date:  11/3/2020  Patient Name:  Tim Pressley      :  1980    MRN: [id number]  Physician: Nandini Kraus, Oliva Webb                             Insurance: [facility]   Approval was received for 72 units = 18 visits, from 10/1/20 to 20. Authorization number [id number].  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  Patient states that pain in left knee is a little higher than normal.       Objective:  Modalities: ionto, vaso prn  Precautions:  Exercises completed marked with \"X\"   Exercise Reps/ Time Weight/ Level Comments    SciFit prn  Pt rides bicycle to clinic           Standing       Bands:    Increased reps   Progressed resistance 10/26    - Over Head Wide  Row    20x Plum  X   - Rows   20x Plum  X   - Protraction  20x Plum  X   - Horizontal abduction  20x Plum  Bilaterally - simultaneously X   - ER    20x Plum Bilaterally - simultaneously X   Doorway ER stretch 3x15\"  Doni X   Corner stretch 3x20\"   X   Scapular Stabilization on Wall    15x 2.2 lb Up/down, side/side, CW/CCW  Ensure only scapular motion - no distal chain activity X   Wall push ups 2x15      Reverse wall push up 2x15      FW flexion 2x15 3 lb Scap retraction  Increased weight 10/19/20  Increased reps 10/26/20    FW change. [] Other:     [x] Patient would benefit from skilled physical therapy services in order to: decrease pain, increase strength and ROM, and increase functional mobility, including tolerance to steps. STG: (to be met in 10 treatments)  1. ? Pain: L shoulder, L knee 2/10 average pain, 7/10 at worst  2. ? ROM: L knee ext 5°; L Shoulder flex 166°, IR 76° (@ 90° abd)  3. ? Strength: L shoulder 4-/5, L hip 4-/5, L knee flex 4+/5, ext 5/5  4. ? Function:  UEFI 10% loss of UE function, LEFS 36% loss of LE function  5. Pt report knee no longer feel weak when walking  6. Pt report decreased frequency of knee edema  7. Patient to be independent with home exercise program as demonstrated by performance with correct form without cues.     LTG: (to be met in 18 treatments)  1. ? Pain: L shoulder, L knee 0/10 average pain, 4/10 at worst  2. ? ROM: L knee ext 2°; L Shoulder IR 82° (@ 90° abd)  3. ? Strength: L shoulder 4/5, L hip 4/5, L knee 5/5  4. ? Function:  UEFI 6% loss of UE function, LEFS 24% loss of LE function  5. Pt report no pain w/ascending, descending steps     Patient goals: No pain, no difficulty w/steps    Pt. Education:  [x] Yes  [] No  [] Reviewed Prior HEP/Ed  Method of Education: [] Verbal  [x] Demo  [] Written  Comprehension of Education:  [x] Verbalizes understanding. [] Demonstrates understanding. [x] Needs review. [] Demonstrates/verbalizes HEP/Ed previously given. 9/30/20: 3 way hip (and lime tband), piriformis stretches, SLR, SAQ, LAQ  https://orthoinfo. aaos. org/en/diseases--conditions/wx-paevoqlux-hdvmeduxgq/     Plan: [x] Continue current frequency toward long and short term goals.     [x] Specific Instructions for subsequent treatments:  quad strengthening, scap stab exercises, add game ready knee to decrease pain and edema, OK for ionto or US to decrease inflammation, consider taping         Time In: 7:43 am            Time Out: 8:30 am      Electronically signed by:  Abel Cope PTA

## 2020-11-06 ENCOUNTER — HOSPITAL ENCOUNTER (OUTPATIENT)
Dept: PHYSICAL THERAPY | Age: 40
Setting detail: THERAPIES SERIES
Discharge: HOME | End: 2020-11-06
Payer: MEDICAID

## 2020-11-06 PROCEDURE — 97110 THERAPEUTIC EXERCISES: CPT

## 2020-11-06 NOTE — FLOWSHEET NOTE
flexion 2x15 3 lb Scap retraction  Increased weight 10/19/20  Increased reps 10/26/20    FW abduction  2x15 3 lb Scap retraction  Increased weight 10/19/20  Increased reps 10/26/20           Gastroc stretch 3x30\"      HS stretch 3x30\"  Doni    Power step Ups    20x 12\" Drive opp knee up  X   Heel Taps  lateral 20x 4 in  X   Heel taps fwd 20x 4 in     TKE   20x5\" Luz  X   3 way hip   15-20x Blue      LEFT only this date X   Lunges    15x ea  Fwd X   BAPs board    20-30x L2 Added for ankle stability/moblity           Seated   Doni    LAQ 20x 2 lb     HS Curls 20x 2 lb  Lime Green            Supine   Doni    SAQ 20x 2 lb     SLR 20x 2 lb     Piriformis stretch 3x20\"  Figure 4 supine and seated    Protraction 2x20 6 lb on           Prone on TG       Flexion 20x 4 lb Increased weight 11/6/20 X   Horizontal  20x 4 lb Increased weight 11/6/20 X   Extension 20x 4 lb Increased weight 11/6/20 X   Rows - Wide  20x 4 lb Elbows away from sides  Increased weight 11/6/20 X          Other: Patient has been resistant to wear face mask throughout entire treatment at EVERY appt and resistant to requests by PT staff to replace mask to face throughout treatment per PT dept and hospital protocol. Has not escalated to intervention by clinic supervisor at this time, but if continues, contact Jarod [place] to discuss with patient.       Manual: \"the stick\" to gastroc/achilles tendon x 4 min     - Time spent in skilled goal assessment and discussion of PT POC moving forward - billed with therex     Instructions for Next Tx:  quad strengthening, scap stab exercises, add game ready knee to decrease pain and edema, OK for ionto to decrease inflammation, consider taping       Treatment Charges: Mins Units   []  Modalities     [x]  Ther Exercise 56 4   [x]  Manual Therapy 4 --   []  Ther Activities     []  Aquatics     []  Vasocompression     []  Other     Total Treatment time 60 mins 4       Assessment: [x] Progressing toward goals: Limited therex completion this date d/t STG assessment. Pt has met all STG/LTG for strength in L shoulder/knee with MMT testing. L shoulder flexion mobility improves with subscap stretching/MET; instructed pt to complete subscap/lat doorway stretch to further this mobility. Still with bilat scapular winging (L>R) noted with theraband exercises, improves with mod tactile cuing and regression of tband strength. Pt requests calf myofacial roll out at end of session d/t reports of increased pain/trigger points - was very tender to roll out but progressively improved with time. [] No change. [x] Other: Patient would benefit from skilled physical therapy services in order to: decrease pain, increase strength and ROM, and increase functional mobility, including tolerance to steps. STG: (to be met in 10 treatments) - Assessed on 11/6/20:  1. ? Pain: L shoulder, L knee 2/10 average pain, 7/10 at worst - Partially met, 3/10 L shoulder, 2/10 L knee; max pain 7/10 L shoulder, 6/10 L knee  2. ? ROM: L knee ext 5°; L Shoulder flex 166°, IR 76° (@ 90° abd) - Partially met, 147deg L shld flex, 90deg ER/IR L shld; L knee ext hyper 3 AROM  3. ? Strength: L shoulder 4-/5, L hip 4-/5, L knee flex 4+/5, ext 5/5 - MET, 5/5 L shoulder all planes, L hip 5/5 all planes except 5-/5 hip ext  4. ? Function:  UEFI 10% loss of UE function, LEFS 36% loss of LE function - Ongoing  5. Pt report knee no longer feel weak when walking - MET, \"Haven't noticed it either way, so it must've gotten better\"  6. Pt report decreased frequency of knee edema - \"There was never too much swelling\"  7. Patient to be independent with home exercise program as demonstrated by performance with correct form without cues.  - MET     LTG: (to be met in 18 treatments)  1. ? Pain: L shoulder, L knee 0/10 average pain, 4/10 at worst  2. ? ROM: L knee ext 2°; L Shoulder IR 82° (@ 90° abd)  3. ? Strength: L shoulder 4/5, L hip 4/5, L knee 5/5 - MET, 5/5 L knee, 5/5 L hip all planes except 5-/5 hip ext  4. ? Function:  UEFI 6% loss of UE function, LEFS 24% loss of LE function  5. Pt report no pain w/ascending, descending steps     Patient goals: No pain, no difficulty w/steps    Pt. Education:  [x] Yes  [] No  [] Reviewed Prior HEP/Ed  Method of Education: [] Verbal  [x] Demo  [] Written  Comprehension of Education:  [x] Verbalizes understanding. [] Demonstrates understanding. [x] Needs review. [] Demonstrates/verbalizes HEP/Ed previously given. 9/30/20: 3 way hip (and lime tband), piriformis stretches, SLR, SAQ, LAQ  [email or web address]://orthoinfo. aaos. org/en/diseases--conditions/ug-fohcjmjkl-epdebloeuk/     Plan: [x] Continue current frequency toward long and short term goals.     [x] Specific Instructions for subsequent treatments:  quad strengthening, scap stab exercises, add game ready knee to decrease pain and edema, OK for ionto or US to decrease inflammation, consider taping         Time In: 9:01 am            Time Out: 10:03 am      Electronically signed by:  Veronica Peacock, PT

## 2020-11-09 ENCOUNTER — HOSPITAL ENCOUNTER (OUTPATIENT)
Dept: PHYSICAL THERAPY | Age: 40
Setting detail: THERAPIES SERIES
Discharge: HOME | End: 2020-11-09
Payer: MEDICAID

## 2020-11-09 PROCEDURE — 97110 THERAPEUTIC EXERCISES: CPT

## 2020-11-09 PROCEDURE — 97140 MANUAL THERAPY 1/> REGIONS: CPT

## 2020-11-09 NOTE — FLOWSHEET NOTE
[x] [facility] &  Therapy  [address]  [phone]  F: [phone]     Physical Therapy Daily Treatment Note    Date:  2020  Patient Name:  Carly Lofton      :  1980    MRN: [id number]  Physician: Magdaleno Wheeler, Mayo Khan                             Insurance: [facility]   Approval was received for 72 units = 18 visits, from 10/1/20 to 20. Authorization number [id number].  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 Rode bike 10 miles for groceries. Patient states he has L knee inflammation when pressing on knee cap.       Objective:  Modalities: ionto, vaso prn  Precautions:  Exercises completed marked with \"X\"   Exercise Reps/ Time Weight/ Level Comments    SciFit prn  Pt rides bicycle to clinic           Subscap manual stretching and MET 5 min   x   Subscap/lat windowsill stretch 3x20\"   x          Standing       Bands:    Increased reps   Progressed resistance     - Over Head Wide  Row    20x Luz  x   - Rows   20x Grey  x   - Protraction  20x Grey  x   - Horizontal abduction  20x Luz Bilaterally - simultaneously x   - ER    20x Grey Bilaterally - simultaneously x   Doorway ER stretch 3x15\"  Doni    Corner stretch 3x20\"   x   Scapular Stabilization on Wall    15x 2.2 lb Up/down, side/side, CW/CCW  Ensure only scapular motion - no distal chain activity x   Wall push ups 2x15   x Reverse wall push up 2x15   x   FW flexion 2x15 3 lb Scap retraction    FW abduction  2x15 3 lb Scap retraction           Gastroc stretch 3x30\"   x   HS stretch 3x30\"  Doni x   Power step Ups    20x 12\"  2 lb Drive opp knee up  x   Heel Taps  lateral 20x 4 in 2 lb  x   Heel taps fwd 20x 4 in 2 lb  x   TKE   20x5\" Luz     3 way hip   20x 2 lb   x   Lunges    20x ea 2 lb Fwd x   BAPs board    20-30x L2 Added for ankle stability/moblity x          Seated   Doni    LAQ 20x 2 lb     HS Curls 20x 2 lb  Lime Green            Supine   Doni    SAQ 20x 2 lb     SLR 20x 2 lb     Piriformis stretch 3x20\"  Figure 4 supine and seated    Protraction 2x20 6 lb on           Prone on TG       Flexion 20x 4 lb  x   Horizontal  20x 4 lb  x   Extension 20x 4 lb  x   Rows - Wide  20x 4 lb Elbows away from sides   x   Supine shld flexion 20x 4 lb Added 11.9 x   Supine horiz abd 20x 4 lb Added 11.9 x          Other: Patient has been resistant to wear face mask throughout entire treatment at EVERY appt and resistant to requests by PT staff to replace mask to face throughout treatment per PT dept and hospital protocol. Has not escalated to intervention by clinic supervisor at this time, but if continues, contact Iman Hernandez to discuss with patient. Manual: Subscap manual stretching and MET with stabilizing lateral scapular border x5 min     Instructions for Next Tx:  quad strengthening, scap stab exercises, add game ready knee to decrease pain and edema, OK for ionto to decrease inflammation, consider taping       Treatment Charges: Mins Units   []  Modalities     [x]  Ther Exercise 56 3   [x]  Manual Therapy 5 1   []  Ther Activities     []  Aquatics     []  Vasocompression     []  Other     Total Treatment time 61 4       Assessment: [x] Progressing toward goals: Increase resistance with thera bands with good tolerance. Added supine flexion and horiz abd with DB. Ankle weights added with standing exercises to increase LE strength. Required cueing to stay on task throughout tx. Ended with MET and scapular stretching. [] No change. [x] Other:  Patient wore different face mask with improved covering of the nose. Removed mask 3x to drink water and catch breath. Discussed the option of doing zoom meetings. []?  Patient would benefit from skilled physical therapy services in order to: decrease pain, increase strength and ROM, and increase functional mobility, including tolerance to steps. STG: (to be met in 10 treatments) - Assessed on 11/6/20:  1. ? Pain: L shoulder, L knee 2/10 average pain, 7/10 at worst - Partially met, 3/10 L shoulder, 2/10 L knee; max pain 7/10 L shoulder, 6/10 L knee  2. ? ROM: L knee ext 5°; L Shoulder flex 166°, IR 76° (@ 90° abd) - Partially met, 147deg L shld flex, 90deg ER/IR L shld; L knee ext hyper 3 AROM  3. ? Strength: L shoulder 4-/5, L hip 4-/5, L knee flex 4+/5, ext 5/5 - MET, 5/5 L shoulder all planes, L hip 5/5 all planes except 5-/5 hip ext  4. ? Function:  UEFI 10% loss of UE function, LEFS 36% loss of LE function - Ongoing  5. Pt report knee no longer feel weak when walking - MET, \"Haven't noticed it either way, so it must've gotten better\"  6. Pt report decreased frequency of knee edema - \"There was never too much swelling\"  7. Patient to be independent with home exercise program as demonstrated by performance with correct form without cues. - MET     LTG: (to be met in 18 treatments)  1. ? Pain: L shoulder, L knee 0/10 average pain, 4/10 at worst  2. ? ROM: L knee ext 2°; L Shoulder IR 82° (@ 90° abd)  3. ? Strength: L shoulder 4/5, L hip 4/5, L knee 5/5 - MET, 5/5 L knee, 5/5 L hip all planes except 5-/5 hip ext  4. ? Function:  UEFI 6% loss of UE function, LEFS 24% loss of LE function  5. Pt report no pain w/ascending, descending steps     Patient goals: No pain, no difficulty w/steps    Pt.  Education:  [x] Yes  [] No  [x] Reviewed Prior HEP/Ed  Method of

## 2020-11-12 ENCOUNTER — HOSPITAL ENCOUNTER (OUTPATIENT)
Dept: PHYSICAL THERAPY | Age: 40
Setting detail: THERAPIES SERIES
Discharge: HOME | End: 2020-11-12
Payer: MEDICAID

## 2020-11-12 PROCEDURE — 97110 THERAPEUTIC EXERCISES: CPT

## 2020-11-12 NOTE — FLOWSHEET NOTE
[x] [facility] &  Therapy  [address]  [phone]  F: [phone]     Physical Therapy Daily Treatment Note    Date:  2020  Patient Name:  Bertrand Palacios      :  1980    MRN: [id number]  Physician: Jennifer Watts Warden Nails                             Insurance: [facility]   Approval was received for 72 units = 18 visits, from 10/1/20 to 20. Authorization number [id number].  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 No pain in the L knee.       Objective:  Modalities: ionto, vaso prn  Precautions:  Exercises completed marked with \"X\"   Exercise Reps/ Time Weight/ Level Comments    SciFit prn  Pt rides bicycle to clinic           Subscap manual stretching and MET 5 min      Subscap/lat windowsill stretch 3x20\"      Good Morning lifts 10x  Added 11.12 x          Standing       Bands:    Increased reps   Progressed resistance     - Over Head Wide  Row    20x Grey     - Rows   20x Grey     - Protraction  20x Grey     - Horizontal abduction  20x Luz Bilaterally - simultaneously    - ER    20x Grey Bilaterally - simultaneously           Cybex   Added 11.12    Rows 20x 5 pl Single arm with 2/ hand  x   Push Pulls 10 ex 2 pl  x   Tricep dips 20x 5 pl Single arm w/ metal bar (close ) x   Extension 20x 3 pl  x   Bicep curls 20x 2 pl  x   Rows 20x 2 pl Double arms x Doorway ER stretch 3x15\"  Doni modified per patient on wall x   Corner stretch 3x20\"   x   Scapular Stabilization on Wall    15x 2.2 lb Up/down, side/side, CW/CCW  Ensure only scapular motion - no distal chain activity    Wall push ups 2x15  In window sill 11.12 x   Reverse wall push up 2x15   x   FW flexion 20x 4 lb  x   FW abduction  20x 4 lb  x          Gastroc stretch 3x30\"      HS stretch 3x30\"  Dnoi    Power step Ups    20x 12\"  2 lb Drive opp knee up     Heel Taps  lateral 20x 4 in 2 lb     Heel taps fwd 20x 4 in 2 lb     TKE   20x5\" Luz     3 way hip   20x 2 lb      Lunges    20x ea 2 lb Fwd    BAPs board    20-30x L2 Added for ankle stability/moblity           Seated   Doni    LAQ 20x 2 lb     HS Curls 20x 2 lb  Lime Green            Supine   Doni    SAQ 20x 2 lb     SLR 20x 2 lb     Piriformis stretch 3x20\"  Figure 4 supine and seated    Protraction 2x20 6 lb on           Prone on TG       Flexion 20x 4 lb     Scaption 20x 4 lb Added 11.12 x   Horizontal  20x 4 lb  x   Extension 20x 4 lb  x   Rows - Wide  20x 4 lb Elbows away from sides      Supine shld flexion 20x 4 lb Added 11.9 x   Supine horiz abd 20x 4 lb Added 11.9           Other: Patient has been resistant to wear face mask throughout entire treatment at EVERY appt and resistant to requests by PT staff to replace mask to face throughout treatment per PT dept and hospital protocol. Has not escalated to intervention by clinic supervisor at this time, but if continues, contact Trenton Ignacio to discuss with patient.       Manual: Subscap manual stretching and MET with stabilizing lateral scapular border x5 min     Instructions for Next Tx:  quad strengthening, scap stab exercises, add game ready knee to decrease pain and edema, OK for ionto to decrease inflammation, consider taping       Treatment Charges: Mins Units   []  Modalities     [x]  Ther Exercise 55 4   []  Manual Therapy     []  Ther Activities     []  Aquatics     []  Vasocompression     []  Other Total Treatment time 55 4       Assessment: [x] Progressing toward goals: Advanced scapular training to Cybex machine to increase strength while engage core. Mod cueing required for rows to avoid scapular protraction. Added Morning lifts for education with 25% carry over. Changed angle of push up / reverse push ups for increased resistance to decrease scapular winging. Educated patient with lateral scap stretch against the wall. [] No change. [x] Other:  Arrived 5 min late to therapy                          [x]? Patient would benefit from skilled physical therapy services in order to: decrease pain, increase strength and ROM, and increase functional mobility, including tolerance to steps. STG: (to be met in 10 treatments) - Assessed on 11/6/20:  1. ? Pain: L shoulder, L knee 2/10 average pain, 7/10 at worst - Partially met, 3/10 L shoulder, 2/10 L knee; max pain 7/10 L shoulder, 6/10 L knee  2. ? ROM: L knee ext 5°; L Shoulder flex 166°, IR 76° (@ 90° abd) - Partially met, 147deg L shld flex, 90deg ER/IR L shld; L knee ext hyper 3 AROM  3. ? Strength: L shoulder 4-/5, L hip 4-/5, L knee flex 4+/5, ext 5/5 - MET, 5/5 L shoulder all planes, L hip 5/5 all planes except 5-/5 hip ext  4. ? Function:  UEFI 10% loss of UE function, LEFS 36% loss of LE function - Ongoing  5. Pt report knee no longer feel weak when walking - MET, \"Haven't noticed it either way, so it must've gotten better\"  6. Pt report decreased frequency of knee edema - \"There was never too much swelling\"  7. Patient to be independent with home exercise program as demonstrated by performance with correct form without cues.  - MET     LTG: (to be met in 18 treatments)  1. ? Pain: L shoulder, L knee 0/10 average pain, 4/10 at worst  2. ? ROM: L knee ext 2°; L Shoulder IR 82° (@ 90° abd)  3. ? Strength: L shoulder 4/5, L hip 4/5, L knee 5/5 - MET, 5/5 L knee, 5/5 L hip all planes except 5-/5 hip ext  4. ? Function:  UEFI 6% loss of UE function, LEFS 24% loss of LE function  5. Pt report no pain w/ascending, descending steps     Patient goals: No pain, no difficulty w/steps    Pt. Education:  [x] Yes  [] No  [x] Reviewed Prior HEP/Ed  Method of Education: [x] Verbal  [x] Demo  [] Written  Comprehension of Education:   [x] Verbalizes understanding. [x] Demonstrates understanding. [x] Needs review. Cybex and protraction need review  [x] Demonstrates/verbalizes HEP/Ed previously given. 9/30/20: 3 way hip (and lime tband), piriformis stretches, SLR, SAQ, LAQ  [email or web address]://[email or web address] aaos. org/en/diseases--conditions/bl-tybaqhbek-zicfhrolzq/     Plan: [x] Continue current frequency toward long and short term goals.     [x] Specific Instructions for subsequent treatments:  Patient request more PROM, advance scapular training         Time In: 1:05 am            Time Out: 2:00 pm      Electronically signed by:  Robbin Rogers PTA

## 2020-11-16 ENCOUNTER — OFFICE VISIT (OUTPATIENT)
Dept: FAMILY MEDICINE CLINIC | Age: 40
End: 2020-11-16
Payer: MEDICAID

## 2020-11-16 VITALS
BODY MASS INDEX: 24.39 KG/M2 | WEIGHT: 170 LBS | DIASTOLIC BLOOD PRESSURE: 66 MMHG | HEART RATE: 81 BPM | SYSTOLIC BLOOD PRESSURE: 116 MMHG | TEMPERATURE: 97.2 F

## 2020-11-16 PROCEDURE — G8420 CALC BMI NORM PARAMETERS: HCPCS | Performed by: STUDENT IN AN ORGANIZED HEALTH CARE EDUCATION/TRAINING PROGRAM

## 2020-11-16 PROCEDURE — 99213 OFFICE O/P EST LOW 20 MIN: CPT | Performed by: STUDENT IN AN ORGANIZED HEALTH CARE EDUCATION/TRAINING PROGRAM

## 2020-11-16 PROCEDURE — G8427 DOCREV CUR MEDS BY ELIG CLIN: HCPCS | Performed by: STUDENT IN AN ORGANIZED HEALTH CARE EDUCATION/TRAINING PROGRAM

## 2020-11-16 PROCEDURE — 1036F TOBACCO NON-USER: CPT | Performed by: STUDENT IN AN ORGANIZED HEALTH CARE EDUCATION/TRAINING PROGRAM

## 2020-11-16 PROCEDURE — G8484 FLU IMMUNIZE NO ADMIN: HCPCS | Performed by: STUDENT IN AN ORGANIZED HEALTH CARE EDUCATION/TRAINING PROGRAM

## 2020-11-16 RX ORDER — ORPHENADRINE CITRATE 100 MG/1
100 TABLET, EXTENDED RELEASE ORAL 2 TIMES DAILY
Qty: 20 TABLET | Refills: 0 | Status: SHIPPED | OUTPATIENT
Start: 2020-11-16 | End: 2020-11-26

## 2020-11-16 RX ORDER — LIDOCAINE 50 MG/G
1 PATCH TOPICAL DAILY
Qty: 30 PATCH | Refills: 0 | Status: SHIPPED | OUTPATIENT
Start: 2020-11-16 | End: 2020-12-16

## 2020-11-16 ASSESSMENT — ENCOUNTER SYMPTOMS
DIARRHEA: 0
CHEST TIGHTNESS: 0
SHORTNESS OF BREATH: 0
CONSTIPATION: 0
VOMITING: 0
ABDOMINAL PAIN: 0
NAUSEA: 0
COUGH: 0
SORE THROAT: 0

## 2020-11-16 NOTE — PROGRESS NOTES
Visit Information    Have you changed or started any medications since your last visit including any over-the-counter medicines, vitamins, or herbal medicines? no   Have you stopped taking any of your medications? Is so, why? -  no  Are you having any side effects from any of your medications? - no    Have you seen any other physician or provider since your last visit? yes - allergist   Have you had any other diagnostic tests since your last visit?  no   Have you been seen in the emergency room and/or had an admission in a hospital since we last saw you?  no   Have you had your routine dental cleaning in the past 6 months?  no     Do you have an active MyChart account? If no, what is the barrier?   Yes    Patient Care Team:  Lucille Richardson MD as PCP - General (Family Medicine)    Medical History Review  Past Medical, Family, and Social History reviewed and does not contribute to the patient presenting condition    Health Maintenance   Topic Date Due    Varicella vaccine (1 of 2 - 2-dose childhood series) 03/03/1982    Flu vaccine (1) 09/01/2020    DTaP/Tdap/Td vaccine (1 - Tdap) 06/25/2021 (Originally 3/3/2000)    HIV screen  Completed    Hepatitis A vaccine  Aged Out    Hepatitis B vaccine  Aged Out    Hib vaccine  Aged Out    Meningococcal (ACWY) vaccine  Aged Out    Pneumococcal 0-64 years Vaccine  Aged Out

## 2020-11-16 NOTE — PROGRESS NOTES
09/11/20 123/77   06/25/20 110/73     Physical Exam  Vitals signs and nursing note reviewed. Constitutional:       Appearance: He is well-developed. Cardiovascular:      Rate and Rhythm: Normal rate and regular rhythm. Heart sounds: Normal heart sounds. No murmur. No friction rub. No gallop. Pulmonary:      Effort: Pulmonary effort is normal. No respiratory distress. Breath sounds: Normal breath sounds. No wheezing or rales. Chest:      Chest wall: No tenderness. Abdominal:      General: Bowel sounds are normal. There is no distension. Palpations: Abdomen is soft. There is no mass. Tenderness: There is no abdominal tenderness. There is no guarding. Musculoskeletal:      Comments: Right Wrist:  Pain on palpation of right ventral wrist.  No swelling, deformity or erythema appreciated. Normal ROM    Neck Exam:  Normal ROM  No swelling, deformity or erythema appreciated. Skin:     Capillary Refill: Capillary refill takes less than 2 seconds. Neurological:      Mental Status: He is alert and oriented to person, place, and time. No results found for: WBC, HGB, HCT, PLT, CHOL, TRIG, HDL, LDLDIRECT, ALT, AST, NA, K, CL, CREATININE, BUN, CO2, TSH, PSA, INR, GLUF, LABA1C, LABMICR  No results found for: CALCIUM, PHOS  No results found for: LDLCALC, LDLCHOLESTEROL, LDLDIRECT    Assessment and Plan:    1. Neck pain, musculoskeletal  - [facility]  - lidocaine (LIDODERM) 5 %; Place 1 patch onto the skin daily 12 hours on, 12 hours off. Dispense: 30 patch; Refill: 0  - orphenadrine (NORFLEX) 100 MG extended release tablet; Take 1 tablet by mouth 2 times daily for 10 days  Dispense: 20 tablet; Refill: 0    2. Encounter for screening for lipid disorder  - Lipid Panel; Future    3. Insomnia, unspecified type  - CBC With Auto Differential; Future  - Comprehensive Metabolic Panel; Future    4.  De Quervain's disease (tenosynovitis)  - Procare Comfort Wrist w/Thumb splint          Requested Prescriptions     Signed Prescriptions Disp Refills    lidocaine (LIDODERM) 5 % 30 patch 0     Sig: Place 1 patch onto the skin daily 12 hours on, 12 hours off.  orphenadrine (NORFLEX) 100 MG extended release tablet 20 tablet 0     Sig: Take 1 tablet by mouth 2 times daily for 10 days       There are no discontinued medications. No follow-ups on file. Stan Veronica received counseling on the following healthy behaviors: nutrition and exercise  Reviewed prior labs and health maintenance  Continue current medications, diet and exercise. Discussed use, benefit, and side effects of prescribed medications. Barriers to medication compliance addressed. Patient given educational materials - see patient instructions  Was a self-tracking handout given in paper form or via [employer]? No:     Requested Prescriptions     Signed Prescriptions Disp Refills    lidocaine (LIDODERM) 5 % 30 patch 0     Sig: Place 1 patch onto the skin daily 12 hours on, 12 hours off.  orphenadrine (NORFLEX) 100 MG extended release tablet 20 tablet 0     Sig: Take 1 tablet by mouth 2 times daily for 10 days       All patient questions answered. Patient voiced understanding. Quality Measures    Body mass index is 24.39 kg/m². Normal. Weight control planned discussed Healthy diet and regular exercise. BP: 116/66(machine) Blood pressure is normal. Treatment plan consists of No treatment change needed.     No results found for: LDLCALC, LDLCHOLESTEROL, LDLDIRECT (goal LDL reduction with dx if diabetes is 50% LDL reduction)      PHQ Scores 6/25/2020   PHQ2 Score 0   PHQ9 Score 0     Interpretation of Total Score Depression Severity: 1-4 = Minimal depression, 5-9 = Mild depression, 10-14 = Moderate depression, 15-19 = Moderately severe depression, 20-27 = Severe depression

## 2020-11-16 NOTE — PATIENT INSTRUCTIONS
Thank you for letting us take care of you today. We hope all your questions were addressed. If a question was overlooked or something else comes to mind after you return home, please contact a member of your Care Team listed below. Your Care Team at [place] [address] is Team #4  Terrell Gonzales MD (Faculty)  Elza Tesfaye MD (Resident)  Pepito Farley MD (Resident)  Francesca Barrios MD (Resident)  Cari Mcneill MD (Resident)  NENITA Herbert,[place] [facility] office)  Conor Ayala, [address] (Clinical Practice Manager)  Pranav Rossi, [address] [place] [address] (Clinical Pharmacist)       Office phone number: [phone]    If you need to get in right away due to illness, please be advised we have \"Same Day\" appointments available Monday-Friday. Please call us at [phone] option #3 to schedule your \"Same Day\" appointment.

## 2020-11-17 ENCOUNTER — HOSPITAL ENCOUNTER (OUTPATIENT)
Dept: PHYSICAL THERAPY | Age: 40
Setting detail: THERAPIES SERIES
Discharge: HOME | End: 2020-11-17
Payer: MEDICAID

## 2020-11-17 ENCOUNTER — APPOINTMENT (OUTPATIENT)
Dept: PHYSICAL THERAPY | Age: 40
End: 2020-11-17
Payer: MEDICAID

## 2020-11-19 ENCOUNTER — HOSPITAL ENCOUNTER (OUTPATIENT)
Dept: PHYSICAL THERAPY | Age: 40
Setting detail: THERAPIES SERIES
Discharge: HOME | End: 2020-11-19
Payer: MEDICAID

## 2020-11-19 ENCOUNTER — TELEPHONE (OUTPATIENT)
Dept: FAMILY MEDICINE CLINIC | Age: 40
End: 2020-11-19

## 2020-11-19 ENCOUNTER — HOSPITAL ENCOUNTER (OUTPATIENT)
Age: 40
Discharge: HOME | End: 2020-11-19
Payer: MEDICAID

## 2020-11-19 LAB
ABSOLUTE EOS #: 0.22 K/UL (ref 0–0.44)
ABSOLUTE IMMATURE GRANULOCYTE: <0.03 K/UL (ref 0–0.3)
ABSOLUTE LYMPH #: 1.78 K/UL (ref 1.1–3.7)
ABSOLUTE MONO #: 0.47 K/UL (ref 0.1–1.2)
ALBUMIN SERPL-MCNC: 4.5 G/DL (ref 3.5–5.2)
ALBUMIN/GLOBULIN RATIO: 1.5 (ref 1–2.5)
ALP BLD-CCNC: 48 U/L (ref 40–129)
ALT SERPL-CCNC: 13 U/L (ref 5–41)
ANION GAP SERPL CALCULATED.3IONS-SCNC: 13 MMOL/L (ref 9–17)
AST SERPL-CCNC: 13 U/L
BASOPHILS # BLD: 1 % (ref 0–2)
BASOPHILS ABSOLUTE: 0.05 K/UL (ref 0–0.2)
BILIRUB SERPL-MCNC: 0.73 MG/DL (ref 0.3–1.2)
BUN BLDV-MCNC: 25 MG/DL (ref 6–20)
BUN/CREAT BLD: ABNORMAL (ref 9–20)
CALCIUM SERPL-MCNC: 9.7 MG/DL (ref 8.6–10.4)
CHLORIDE BLD-SCNC: 101 MMOL/L (ref 98–107)
CHOLESTEROL/HDL RATIO: 2.9
CHOLESTEROL: 170 MG/DL
CO2: 28 MMOL/L (ref 20–31)
CREAT SERPL-MCNC: 0.74 MG/DL (ref 0.7–1.2)
DIFFERENTIAL TYPE: NORMAL
EOSINOPHILS RELATIVE PERCENT: 4 % (ref 1–4)
GFR AFRICAN AMERICAN: >60 ML/MIN
GFR NON-AFRICAN AMERICAN: >60 ML/MIN
GFR SERPL CREATININE-BSD FRML MDRD: ABNORMAL ML/MIN/{1.73_M2}
GFR SERPL CREATININE-BSD FRML MDRD: ABNORMAL ML/MIN/{1.73_M2}
GLUCOSE BLD-MCNC: 85 MG/DL (ref 70–99)
HCT VFR BLD CALC: 46.3 % (ref 40.7–50.3)
HDLC SERPL-MCNC: 58 MG/DL
HEMOGLOBIN: 15.1 G/DL (ref 13–17)
IMMATURE GRANULOCYTES: 0 %
LDL CHOLESTEROL: 99 MG/DL (ref 0–130)
LYMPHOCYTES # BLD: 31 % (ref 24–43)
MCH RBC QN AUTO: 28.9 PG (ref 25.2–33.5)
MCHC RBC AUTO-ENTMCNC: 32.6 G/DL (ref 28.4–34.8)
MCV RBC AUTO: 88.7 FL (ref 82.6–102.9)
MONOCYTES # BLD: 8 % (ref 3–12)
NRBC AUTOMATED: 0 PER 100 WBC
PDW BLD-RTO: 13 % (ref 11.8–14.4)
PLATELET # BLD: 379 K/UL (ref 138–453)
PLATELET ESTIMATE: NORMAL
PMV BLD AUTO: 9.4 FL (ref 8.1–13.5)
POTASSIUM SERPL-SCNC: 4.1 MMOL/L (ref 3.7–5.3)
RBC # BLD: 5.22 M/UL (ref 4.21–5.77)
RBC # BLD: NORMAL 10*6/UL
SEG NEUTROPHILS: 56 % (ref 36–65)
SEGMENTED NEUTROPHILS ABSOLUTE COUNT: 3.15 K/UL (ref 1.5–8.1)
SODIUM BLD-SCNC: 142 MMOL/L (ref 135–144)
TOTAL PROTEIN: 7.5 G/DL (ref 6.4–8.3)
TRIGL SERPL-MCNC: 67 MG/DL
VLDLC SERPL CALC-MCNC: NORMAL MG/DL (ref 1–30)
WBC # BLD: 5.7 K/UL (ref 3.5–11.3)
WBC # BLD: NORMAL 10*3/UL

## 2020-11-19 PROCEDURE — 80053 COMPREHEN METABOLIC PANEL: CPT

## 2020-11-19 PROCEDURE — 85025 COMPLETE CBC W/AUTO DIFF WBC: CPT

## 2020-11-19 PROCEDURE — 97110 THERAPEUTIC EXERCISES: CPT

## 2020-11-19 PROCEDURE — 36415 COLL VENOUS BLD VENIPUNCTURE: CPT

## 2020-11-19 PROCEDURE — 80061 LIPID PANEL: CPT

## 2020-11-19 NOTE — TELEPHONE ENCOUNTER
PA request for    orphenadrine       PA processed and submitted to pt insurance, waiting for response in regards to coverage

## 2020-11-19 NOTE — FLOWSHEET NOTE
[x] [address] [facility] &  Therapy  [address]  [phone]  F: [phone]     Physical Therapy Daily Treatment Note    Date:  2020  Patient Name:  Alison Valdivia      :  1980    MRN: [id number]  Physician: Leonardo Metzger, [facility]                             Insurance: [facility]   Approval was received for 72 units = 18 visits, from 10/1/20 to 20. Authorization number [id number].  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 Able to accommodate Pt. Patient reports shoulder was bothering him yesterday at 4-5/10, just started his day today, so not sure yet about today. Pt feels knee has small improvement. Pt reports he has been using his weights at home but not doing the tband ex. Pt has new order to add neck to Rx, he wishes to hold off on as his insurance will be changing soon, wants to wait until new insurance starts.       Objective:  Modalities: ionto, vaso prn  Precautions:  Exercises completed marked with \"X\"   Exercise Reps/ Time Weight/ Level Comments    SciFit prn  Pt rides bicycle to clinic           Subscap manual stretching and MET 5 min      Subscap/lat windowsill stretch 3x20\"      Good Morning lifts 10x  Added 11.12           Standing       Bands:        - Over Head Wide  Row    20x Grey     - Rows   20x Grey  x   - Protraction  20x Grey  x compliance since that time. Manual: Subscap manual stretching and MET with stabilizing lateral scapular border x5 min-Held 11/20        Treatment Charges: Mins Units   []  Modalities     [x]  Ther Exercise 57 4   []  Manual Therapy     []  Ther Activities     []  Aquatics     []  Vasocompression     []  Other     Total Treatment time 57 4       Assessment: [x] Progressing toward goals: Instructed pt in correct posture during tband ex, Pt reports he prefers to keep head down to stretch his post neck. Pt complains of pain post shoulder w/cybex extension, added post shoulder stretch afterwards to decrease pain. [] No change. [] Other:                            [x]? Patient would benefit from skilled physical therapy services in order to: decrease pain, increase strength and ROM, and increase functional mobility, including tolerance to steps. STG: (to be met in 10 treatments) - Assessed on 11/6/20:  1. ? Pain: L shoulder, L knee 2/10 average pain, 7/10 at worst - Partially met, 3/10 L shoulder, 2/10 L knee; max pain 7/10 L shoulder, 6/10 L knee  2. ? ROM: L knee ext 5°; L Shoulder flex 166°, IR 76° (@ 90° abd) - Partially met, 147deg L shld flex, 90deg ER/IR L shld; L knee ext hyper 3 AROM  3. ? Strength: L shoulder 4-/5, L hip 4-/5, L knee flex 4+/5, ext 5/5 - MET, 5/5 L shoulder all planes, L hip 5/5 all planes except 5-/5 hip ext  4. ? Function:  UEFI 10% loss of UE function, LEFS 36% loss of LE function - Ongoing  5. Pt report knee no longer feel weak when walking - MET, \"Haven't noticed it either way, so it must've gotten better\"  6. Pt report decreased frequency of knee edema - \"There was never too much swelling\"  7. Patient to be independent with home exercise program as demonstrated by performance with correct form without cues.  - MET     LTG: (to be met in 18 treatments)  1. ? Pain: L shoulder, L knee 0/10 average pain, 4/10 at worst  2. ? ROM: L knee ext 2°; L Shoulder IR 82° (@ 90° abd)  3. ? Strength: L shoulder 4/5, L hip 4/5, L knee 5/5 - MET, 5/5 L knee, 5/5 L hip all planes except 5-/5 hip ext  4. ? Function:  UEFI 6% loss of UE function, LEFS 24% loss of LE function  5. Pt report no pain w/ascending, descending steps     Patient goals: No pain, no difficulty w/steps    Pt. Education:  [x] Yes  [] No  [x] Reviewed Prior HEP/Ed  Method of Education: [x] Verbal  [x] Demo  [] Written  Comprehension of Education:   [x] Verbalizes understanding. [x] Demonstrates understanding. [x] Needs review. Cybex and protraction need review  [x] Demonstrates/verbalizes HEP/Ed previously given. 9/30/20: 3 way hip (and lime tband), piriformis stretches, SLR, SAQ, LAQ  https://orthoinfo. aaos. org/en/diseases--conditions/lm-qyzfvnvjc-wlbyfmekdq/     Plan: [x] Continue current frequency toward long and short term goals.     [x] Specific Instructions for subsequent treatments:  Patient request more PROM, advance scapular training         Time In: 8400            Time Out: 6323      Electronically signed by:  Sarah Suarez, PT

## 2020-11-19 NOTE — FLOWSHEET NOTE
[x] [facility]) - [facility] &  Therapy  [address]    [phone]  F: [phone]   [] [address]  [address]   [address]  P: [phone]  F: [phone]  [] [address] [facility] &  Therapy  [address]  P: [phone]  F: [phone] [] [address]  P: [phone]  F: [phone]  [] [address] [facility]   [address]   [place]: [phone]  F: [phone]   [] [place] [address] [facility] [address]  [place]: [phone]   F: [phone]     Physical Therapy Cancel/No Show note    Date: 2020  Patient: Smita Mcelroy  : 1980  MRN: [id number]    Cancels/No Shows to date:     For today's appointment patient:    []  Cancelled    [] Rescheduled appointment    [x] No-show      Reason given by patient:    []  Patient ill    []  Conflicting appointment    [] No transportation      [] Conflict with work    [] No reason given    [] Weather related    [] COVID-19    [] Other:      Comments:        [x] Next appointment was confirmed: Given Schedule.      Electronically signed by: Cristiano Moore, PT

## 2020-11-23 ENCOUNTER — HOSPITAL ENCOUNTER (OUTPATIENT)
Dept: PHYSICAL THERAPY | Age: 40
Setting detail: THERAPIES SERIES
Discharge: HOME | End: 2020-11-23
Payer: MEDICAID

## 2020-11-23 PROCEDURE — 97110 THERAPEUTIC EXERCISES: CPT

## 2020-11-23 NOTE — FLOWSHEET NOTE
[x] [facility] &  Therapy  [address]  [phone]  F: [phone]     Physical Therapy Daily Treatment Note    Date:  2020  Patient Name:  Smita Mcelroy      :  1980    MRN: [id number]  Physician: Melba Cruz Leron Mink                             Insurance: [facility]   Approval was received for 72 units = 18 visits, from 10/1/20 to 20. Authorization number [id number].  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 Knee is not sore today due to not really using it, was sore yesterday. Pt reported increased soreness to the L tricep due to working out a few days ago.      Objective:  Modalities: ionto, vaso prn  Precautions:  Exercises completed marked with \"X\"   Exercise Reps/ Time Weight/ Level Comments    SciFit prn  Pt rides bicycle to clinic           Subscap manual stretching and MET 5 min      Subscap/lat windowsill stretch 3x20\"      Good Morning lifts 10x  Added 11.12           Standing       Bands:        - Over Head Wide  Row    20x Grey     - Rows   20x Grey  x   - Protraction  20x Grey  x   - Horizontal abduction  20x Luz Bilaterally - simultaneously x   - ER    20x Grey Bilaterally - simultaneously,  x          Cybex       Rows 20x 5 pl Single arm with 2/ hand  x   Push Pulls 10 ex 2 pl  x   Tricep dips 20x 3 pl Single arm w/ metal bar (close ) x   Extension 20x 3 pl, 2 pl Decreased weight after 10 reps  x   Bicep curls 20x 2 pl  x   Rows 20x 2 pl Double arms x          Doorway ER stretch 3x15\"  Doni modified per patient on wall    Corner stretch 3x20\"      Scapular Stabilization on Wall    15x 2.2 lb Up/down, side/side, CW/CCW  Ensure only scapular motion - no distal chain activity    Wall push ups 2x15  In window sill 11.12    Reverse wall push up 2x15      FW flexion 20x 4 lb     FW abduction  20x 4 lb            Post shoulder stretch 3x 30sec Added 11/19 due to pain w/cybex ext x   Gastroc stretch 3x30\"  On slant board x   HS stretch 3x30\"  Doni x   Power step Ups    20x 12\"  2 lb Drive opp knee up  x   Heel Taps  lateral 20x 4 in 2 lb  x   Heel taps fwd 20x 4 in 2 lb  x   TKE   20x5\" Luz     3 way hip   20x 2 lb   x   Lunges    20x ea 2 lb Fwd x   BAPs board    30x ea L2 for ankle stability/moblity; ant/post, med/lat, cw, ccw x          Seated   Doni    LAQ 20x 2 lb     HS Curls 20x 2 lb  Lime Green            Supine   Doni    SAQ 20x 2 lb     SLR 20x 2 lb     Piriformis stretch 3x20\"  Figure 4 supine and seated    Protraction 2x20 6 lb on           Prone on TG       Flexion 20x 4 lb     Scaption 20x 4 lb Added 11.12    Horizontal  20x 4 lb     Extension 20x 4 lb     Rows - Wide  20x 4 lb Elbows away from sides      Supine shld flexion 20x 4 lb Added 11.9    Supine horiz abd 20x 4 lb Added 11.9           Other: Patient has been resistant to wear face mask throughout entire treatment at EVERY appt and resistant to requests by PT staff to replace mask to face throughout treatment per PT dept and hospital protocol. Clinic supervisor has discussed this with patient, note slightly improved compliance since that time.        Manual: Subscap manual stretching and MET with stabilizing lateral scapular border x5 min-Held 11/20        Treatment Charges: Mins Units   []  Modalities     [x]  Ther Exercise 54 4   []  Manual Therapy     []  Ther Activities     []  Aquatics     [] Vasocompression     []  Other     Total Treatment time 54 4       Assessment: [x] Progressing toward goals:  Decreased weight and resistance to all tricep activities due to patient request and increased soreness. No progressions this date due to increased patient soreness. Pt declined modalities. [] No change. [] Other:                            [x]? Patient would benefit from skilled physical therapy services in order to: decrease pain, increase strength and ROM, and increase functional mobility, including tolerance to steps. STG: (to be met in 10 treatments) - Assessed on 11/6/20:  1. ? Pain: L shoulder, L knee 2/10 average pain, 7/10 at worst - Partially met, 3/10 L shoulder, 2/10 L knee; max pain 7/10 L shoulder, 6/10 L knee  2. ? ROM: L knee ext 5°; L Shoulder flex 166°, IR 76° (@ 90° abd) - Partially met, 147deg L shld flex, 90deg ER/IR L shld; L knee ext hyper 3 AROM  3. ? Strength: L shoulder 4-/5, L hip 4-/5, L knee flex 4+/5, ext 5/5 - MET, 5/5 L shoulder all planes, L hip 5/5 all planes except 5-/5 hip ext  4. ? Function:  UEFI 10% loss of UE function, LEFS 36% loss of LE function - Ongoing  5. Pt report knee no longer feel weak when walking - MET, \"Haven't noticed it either way, so it must've gotten better\"  6. Pt report decreased frequency of knee edema - \"There was never too much swelling\"  7. Patient to be independent with home exercise program as demonstrated by performance with correct form without cues. - MET     LTG: (to be met in 18 treatments)  1. ? Pain: L shoulder, L knee 0/10 average pain, 4/10 at worst  2. ? ROM: L knee ext 2°; L Shoulder IR 82° (@ 90° abd)  3. ? Strength: L shoulder 4/5, L hip 4/5, L knee 5/5 - MET, 5/5 L knee, 5/5 L hip all planes except 5-/5 hip ext  4. ? Function:  UEFI 6% loss of UE function, LEFS 24% loss of LE function  5. Pt report no pain w/ascending, descending steps     Patient goals: No pain, no difficulty w/steps    Pt.  Education:  [x] Yes [] No  [x] Reviewed Prior HEP/Ed  Method of Education: [x] Verbal  [x] Demo  [] Written  Comprehension of Education:   [x] Verbalizes understanding. [x] Demonstrates understanding. [x] Needs review. Cybex and protraction need review  [x] Demonstrates/verbalizes HEP/Ed previously given. 9/30/20: 3 way hip (and lime tband), piriformis stretches, SLR, SAQ, LAQ  https://orthoinfo. aaos. org/en/diseases--conditions/cc-okadycptg-iycynmsdqp/     Plan: [x] Continue current frequency toward long and short term goals.     [x] Specific Instructions for subsequent treatments:  Patient request more PROM, advance scapular training         Time In: 3033            Time Out: 2718      Electronically signed by:  Amanda Colon PTA

## 2020-11-25 ENCOUNTER — APPOINTMENT (OUTPATIENT)
Dept: PHYSICAL THERAPY | Age: 40
End: 2020-11-25
Payer: MEDICAID

## 2020-12-04 ENCOUNTER — HOSPITAL ENCOUNTER (OUTPATIENT)
Dept: PHYSICAL THERAPY | Age: 40
Setting detail: THERAPIES SERIES
Discharge: HOME | End: 2020-12-04
Payer: MEDICAID

## 2020-12-07 ENCOUNTER — HOSPITAL ENCOUNTER (OUTPATIENT)
Dept: PHYSICAL THERAPY | Age: 40
Setting detail: THERAPIES SERIES
Discharge: HOME | End: 2020-12-07
Payer: MEDICAID

## 2020-12-14 NOTE — DISCHARGE SUMMARY
[] [facility]) [facility] &  Therapy  [address]  [phone]  F: [phone] [] [address]  [facility] [address]   [address]  P: [phone]  F: [phone] [] [address] &  Therapy  [address]  P: [phone]  F: [phone] [] [address]  P: [phone]  F: [phone] [] [address]  [address] [facility]   [address]   [place]: [phone]  F: [phone]      Physical Therapy Discharge Note    Date: 2020      Patient: Nitesh Serna  : 1980  MRN: [id number]    [address] [place]a Duque, [place] [facility]                             Insurance: [facility] (auth for 43 YAUER, from 10/1/20 to 20)    Medical Diagnosis: Winged L scapula M95.8, Chronic pain L knee M25.562, G89.29                    Rehab Codes: M25.512, M25.562, M25.612, M25.662, M25.462,M54.5, R29.3, M62.51  Onset Date: 2019                                           Next 's appt: unknown     Total visits attended: 15  Cancels/No shows: 0/3  Date of initial visit: 2020                Date of final visit: 2020      Subjective:  Pain:  [x]? Yes  []? No   Location: L scapula, L lateral knee       Pain Rating: (0-10 scale) 3/10 L knee  Pain altered Tx:  [x]? No  []? Yes  Action:  Comments:  Pt cancelled appt stated he had a sore throat and would call back to reschedule. No further contact with Pt since that time, will discharge per attendance policy. Objective:  Test Measurements: See below, Unable to Re-assess secondary to missed appointments. Function: See below, Unable to Re-assess secondary to missed appointments. Assessment: Unable to Re-assess secondary to missed appointments.     STG: (to be met indicated at this time. [] Pt to continue exercise/home instructions independently. [] Therapy interrupted due to:    [x] Pt has 2 or more no shows/cancels, is discontinued per our policy. [] Pt has completed prescribed number of treatment sessions. [] Other:       Electronically signed by Brice Fox PT on 12/23/2020 at 2:37 PM        If you have any questions or concerns, please don't hesitate to call.   Thank you for your referral.

## 2021-01-18 ENCOUNTER — HOSPITAL ENCOUNTER (OUTPATIENT)
Age: 41
Setting detail: SPECIMEN
Discharge: HOME | End: 2021-01-18
Payer: COMMERCIAL

## 2021-01-18 ENCOUNTER — OFFICE VISIT (OUTPATIENT)
Dept: PRIMARY CARE CLINIC | Age: 41
End: 2021-01-18
Payer: COMMERCIAL

## 2021-01-18 VITALS
DIASTOLIC BLOOD PRESSURE: 70 MMHG | BODY MASS INDEX: 24.34 KG/M2 | SYSTOLIC BLOOD PRESSURE: 106 MMHG | WEIGHT: 170 LBS | OXYGEN SATURATION: 98 % | HEART RATE: 85 BPM | HEIGHT: 70 IN | TEMPERATURE: 99.9 F

## 2021-01-18 DIAGNOSIS — R68.89 FLU-LIKE SYMPTOMS: Primary | ICD-10-CM

## 2021-01-18 PROCEDURE — G8427 DOCREV CUR MEDS BY ELIG CLIN: HCPCS | Performed by: INTERNAL MEDICINE

## 2021-01-18 PROCEDURE — G8484 FLU IMMUNIZE NO ADMIN: HCPCS | Performed by: INTERNAL MEDICINE

## 2021-01-18 PROCEDURE — 99202 OFFICE O/P NEW SF 15 MIN: CPT | Performed by: INTERNAL MEDICINE

## 2021-01-18 PROCEDURE — 1036F TOBACCO NON-USER: CPT | Performed by: INTERNAL MEDICINE

## 2021-01-18 PROCEDURE — G8420 CALC BMI NORM PARAMETERS: HCPCS | Performed by: INTERNAL MEDICINE

## 2021-01-18 ASSESSMENT — ENCOUNTER SYMPTOMS
DIARRHEA: 1
FLU SYMPTOMS: 1

## 2021-01-18 NOTE — PROGRESS NOTES
[address] [place] [address]  Dept: [phone]  Dept Fax: [phone]    Willy Larios is a 45 y.o. male who presents to the urgent care today for his medicalconditions/complaints as noted below. Willy Larios is c/o of Fever (onset for 4 days with all three systems), Headache, and Diarrhea      HPI:     Influenza  This is a new problem. The current episode started in the past 7 days. The problem occurs constantly. The problem has been unchanged. Associated symptoms include a fever and headaches. Nothing aggravates the symptoms. He has tried nothing for the symptoms. The treatment provided no relief. Past Medical History:   Diagnosis Date    Asthma         No current outpatient medications on file. No current facility-administered medications for this visit. Allergies   Allergen Reactions    Amoxicillin Nausea And Vomiting       Health Maintenance   Topic Date Due    Hepatitis C screen  1980    Varicella vaccine (1 of 2 - 2-dose childhood series) 03/03/1982    Flu vaccine (1) 09/01/2020    DTaP/Tdap/Td vaccine (1 - Tdap) 06/25/2021 (Originally 3/3/2000)    HIV screen  Completed    Hepatitis A vaccine  Aged Out    Hepatitis B vaccine  Aged Out    Hib vaccine  Aged Out    Meningococcal (ACWY) vaccine  Aged Out    Pneumococcal 0-64 years Vaccine  Aged Out       Subjective:      Review of Systems   Constitutional: Positive for fever. Gastrointestinal: Positive for diarrhea. Neurological: Positive for headaches. All other systems reviewed and are negative. Objective:     Physical Exam  Vitals signs reviewed. Constitutional:       Appearance: Normal appearance. HENT:      Head: Normocephalic and atraumatic. Skin:     General: Skin is warm and dry. Neurological:      General: No focal deficit present. Mental Status: He is alert and oriented to person, place, and time.    Psychiatric:         Mood and Affect: Mood normal.         Behavior: Behavior normal.       /70 (Site: Left Upper Arm, Position: Sitting, Cuff Size: Large Adult)   Pulse 85   Temp 99.9 °F (37.7 °C) (Oral)   Ht 5' 10\" (1.778 m)   Wt 170 lb (77.1 kg)   SpO2 98%   BMI 24.39 kg/m²       Assessment:       Diagnosis Orders   1. Flu-like symptoms  COVID-19 Ambulatory       Plan:      No follow-ups on file. No orders of the defined types were placed in this encounter. Orders Placed This Encounter   Procedures    COVID-19 Ambulatory     Standing Status:   Future     Standing Expiration Date:   1/18/2022     Scheduling Instructions:      Saline media preferred given current shortage of viral transport media but both acceptable     Order Specific Question:   Is this test for diagnosis or screening? Answer:   Diagnosis of ill patient     Order Specific Question:   Symptomatic for COVID-19 as defined by CDC? Answer:   Yes     Order Specific Question:   Date of Symptom Onset     Answer:   1/15/2021     Order Specific Question:   Hospitalized for COVID-19? Answer:   No     Order Specific Question:   Admitted to ICU for COVID-19? Answer:   No     Order Specific Question:   Employed in healthcare setting? Answer:   No     Order Specific Question:   Resident in a congregate (group) care setting? Answer:   No     Order Specific Question:   Pregnant: Answer:   No            Patient given educational materials - see patient instructions. Discussed use, benefit, and side effects of prescribed medications. All patientquestions answered. Pt voiced understanding.     Electronically signed by Charley Pugh MD on 1/18/2021at 1:20 PM

## 2021-01-19 LAB
SARS-COV-2, RAPID: ABNORMAL
SARS-COV-2: ABNORMAL
SARS-COV-2: DETECTED
SOURCE: ABNORMAL

## 2021-03-01 ENCOUNTER — TELEPHONE (OUTPATIENT)
Dept: FAMILY MEDICINE CLINIC | Age: 41
End: 2021-03-01

## 2021-03-11 ENCOUNTER — OFFICE VISIT (OUTPATIENT)
Dept: FAMILY MEDICINE CLINIC | Age: 41
End: 2021-03-11
Payer: COMMERCIAL

## 2021-03-11 VITALS
BODY MASS INDEX: 23.24 KG/M2 | WEIGHT: 162 LBS | DIASTOLIC BLOOD PRESSURE: 72 MMHG | SYSTOLIC BLOOD PRESSURE: 121 MMHG | HEART RATE: 73 BPM

## 2021-03-11 DIAGNOSIS — M79.641 RIGHT HAND PAIN: ICD-10-CM

## 2021-03-11 DIAGNOSIS — Z87.09: Primary | ICD-10-CM

## 2021-03-11 DIAGNOSIS — Z98.890: Primary | ICD-10-CM

## 2021-03-11 DIAGNOSIS — G47.00 INSOMNIA, UNSPECIFIED TYPE: ICD-10-CM

## 2021-03-11 DIAGNOSIS — H43.392 VITREOUS FLOATERS OF LEFT EYE: ICD-10-CM

## 2021-03-11 PROCEDURE — G8484 FLU IMMUNIZE NO ADMIN: HCPCS | Performed by: STUDENT IN AN ORGANIZED HEALTH CARE EDUCATION/TRAINING PROGRAM

## 2021-03-11 PROCEDURE — 99213 OFFICE O/P EST LOW 20 MIN: CPT | Performed by: STUDENT IN AN ORGANIZED HEALTH CARE EDUCATION/TRAINING PROGRAM

## 2021-03-11 PROCEDURE — 1036F TOBACCO NON-USER: CPT | Performed by: STUDENT IN AN ORGANIZED HEALTH CARE EDUCATION/TRAINING PROGRAM

## 2021-03-11 PROCEDURE — G8427 DOCREV CUR MEDS BY ELIG CLIN: HCPCS | Performed by: STUDENT IN AN ORGANIZED HEALTH CARE EDUCATION/TRAINING PROGRAM

## 2021-03-11 PROCEDURE — G8420 CALC BMI NORM PARAMETERS: HCPCS | Performed by: STUDENT IN AN ORGANIZED HEALTH CARE EDUCATION/TRAINING PROGRAM

## 2021-03-11 RX ORDER — DOXEPIN HYDROCHLORIDE 25 MG/1
25 CAPSULE ORAL NIGHTLY
Qty: 30 CAPSULE | Refills: 3 | Status: SHIPPED | OUTPATIENT
Start: 2021-03-11

## 2021-03-11 ASSESSMENT — ENCOUNTER SYMPTOMS
DIARRHEA: 0
SORE THROAT: 0
SHORTNESS OF BREATH: 0
CONSTIPATION: 0
NAUSEA: 0
COUGH: 0
CHEST TIGHTNESS: 0
VOMITING: 0
RHINORRHEA: 1
ABDOMINAL PAIN: 0

## 2021-03-11 ASSESSMENT — PATIENT HEALTH QUESTIONNAIRE - PHQ9
2. FEELING DOWN, DEPRESSED OR HOPELESS: 0
SUM OF ALL RESPONSES TO PHQ QUESTIONS 1-9: 0
1. LITTLE INTEREST OR PLEASURE IN DOING THINGS: 0
SUM OF ALL RESPONSES TO PHQ9 QUESTIONS 1 & 2: 0
SUM OF ALL RESPONSES TO PHQ QUESTIONS 1-9: 0

## 2021-03-11 NOTE — PROGRESS NOTES
Attending Physician Statement  I  have discussed the care of Bonnie Hodgson including pertinent history and exam findings with the resident. I agree with the assessment, plan and orders as documented by the resident. /72 (Site: Left Upper Arm, Position: Sitting, Cuff Size: Medium Adult)   Pulse 73   Wt 162 lb (73.5 kg)   BMI 23.24 kg/m²    BP Readings from Last 3 Encounters:   03/11/21 121/72   01/18/21 106/70   11/16/20 116/66     Wt Readings from Last 3 Encounters:   03/11/21 162 lb (73.5 kg)   01/18/21 170 lb (77.1 kg)   11/16/20 170 lb (77.1 kg)          Diagnosis Orders   1. S/P manipulation of deviated nasal septum  External Referral To ENT   2. Right hand pain  XR WRIST RIGHT (2 VIEWS)   3. Vitreous floaters of left eye  Bejot, [place] [facility], Optometry, [place]   4.  Insomnia, unspecified type  doxepin (SINEQUAN) 25 MG capsule           Mariana Serrano DO 3/11/2021 10:37 AM

## 2021-03-11 NOTE — PROGRESS NOTES
Subjective: Paul Adnrew is a 37 y.o. male with  has a past medical history of Asthma. No family history on file. Presented tothe office today for:  Chief Complaint   Patient presents with    Wrist Pain     has only gotten about 80% better - intermitten pain        HPI    Chief Complaint: Problem breathing  When did it happen? AT a young age  Mechanism? Breathing difficulty  Getting better, worse or staying the same? Aggravating: Dry house;   Associated: Headaches;   Treatments/Alleviating/Medications:   - Sx at age 2 - sinus windows  - ENT Dr. Caty Melchor - June sx without any  relief  - Nasal sprays: saline, steroid      Chief Complaint: RIGHT WRIST PAIN  When did it happen? 5 Montha ago  Mechanism? FALL  Location: Wrist and proximal thubm  Intensity: 9/10  Quality: sharp  Radiation:none  Getting better, worse or staying the same? worse  Aggravating: moving and exercise  Associated: none  Treatments/Alleviating/Medications:   - exercises without any relief  - Wrist brace    Review of Systems   Constitutional: Negative for chills, fatigue, fever and unexpected weight change. HENT: Positive for rhinorrhea. Negative for congestion, mouth sores and sore throat. Eyes: Negative for visual disturbance. Respiratory: Negative for cough, chest tightness and shortness of breath. Cardiovascular: Negative for chest pain and leg swelling. Gastrointestinal: Negative for abdominal pain, constipation, diarrhea, nausea and vomiting. Genitourinary: Negative for difficulty urinating. Musculoskeletal: Negative for joint swelling. Wrist pain   Skin: Negative for rash. Neurological: Negative for dizziness, weakness and headaches.        Objective:    /72 (Site: Left Upper Arm, Position: Sitting, Cuff Size: Medium Adult)   Pulse 73   Wt 162 lb (73.5 kg)   BMI 23.24 kg/m²    BP Readings from Last 3 Encounters:   03/11/21 121/72   01/18/21 106/70   11/16/20 116/66     Physical Exam  Vitals signs and nursing note reviewed. Constitutional:       Appearance: He is well-developed. Cardiovascular:      Rate and Rhythm: Normal rate and regular rhythm. Heart sounds: Normal heart sounds. No murmur. No friction rub. No gallop. Pulmonary:      Effort: Pulmonary effort is normal. No respiratory distress. Breath sounds: Normal breath sounds. No wheezing or rales. Chest:      Chest wall: No tenderness. Abdominal:      General: Bowel sounds are normal. There is no distension. Palpations: Abdomen is soft. There is no mass. Tenderness: There is no abdominal tenderness. There is no guarding. Musculoskeletal:      Comments: Right Wrist:  Pain on palpation of right ventral wrist.  No swelling, deformity or erythema appreciated. Normal ROM  - Handgrip 5/5  - NO handgrip weakness   Skin:     Capillary Refill: Capillary refill takes less than 2 seconds. Neurological:      Mental Status: He is alert and oriented to person, place, and time. Lab Results   Component Value Date    WBC 5.7 11/19/2020    HGB 15.1 11/19/2020    HCT 46.3 11/19/2020     11/19/2020    CHOL 170 11/19/2020    TRIG 67 11/19/2020    HDL 58 11/19/2020    ALT 13 11/19/2020    AST 13 11/19/2020     11/19/2020    K 4.1 11/19/2020     11/19/2020    CREATININE 0.74 11/19/2020    BUN 25 (H) 11/19/2020    CO2 28 11/19/2020     Lab Results   Component Value Date    CALCIUM 9.7 11/19/2020     Lab Results   Component Value Date    LDLCHOLESTEROL 99 11/19/2020       Assessment and Plan:    1. S/P manipulation of deviated nasal septum  - External Referral To ENT  -Medicaid insurance - current ENT unable to accept insurance    2. Right hand pain  - XR WRIST RIGHT (2 VIEWS); Future  - Tried wrist brace for 6 weeks without any relief    3. Vitreous floaters of left eye  - Radha Regalado, Optometry, [facility]    4.  Insomnia  - Doxepin 25mg nightly  - Education on sleep hygiene  - Cool room and no caffenated beverages before beftime    Requested Prescriptions      No prescriptions requested or ordered in this encounter       There are no discontinued medications. Return in about 3 months (around 6/11/2021). Romell Schirmer received counseling on the following healthy behaviors: nutrition and exercise  Reviewed prior labs and health maintenance  Continue current medications, diet and exercise. Discussed use, benefit, and side effects of prescribed medications. Barriers to medication compliance addressed. Patient given educational materials - see patient instructions  Was a self-tracking handout given in paper form or via [employer]? Yes    Requested Prescriptions      No prescriptions requested or ordered in this encounter       All patient questions answered. Patient voiced understanding. Quality Measures    Body mass index is 23.24 kg/m². Normal. Weight control planned discussed Healthy diet and regular exercise. BP: 121/72 Blood pressure is normal. Treatment plan consists of No treatment change needed.     Lab Results   Component Value Date    LDLCHOLESTEROL 99 11/19/2020    (goal LDL reduction with dx if diabetes is 50% LDL reduction)      PHQ Scores 3/11/2021 6/25/2020   PHQ2 Score 0 0   PHQ9 Score 0 0     Interpretation of Total Score Depression Severity: 1-4 = Minimal depression, 5-9 = Mild depression, 10-14 = Moderate depression, 15-19 = Moderately severe depression, 20-27 = Severe depression

## 2021-03-11 NOTE — PROGRESS NOTES
Visit Information    Have you changed or started any medications since your last visit including any over-the-counter medicines, vitamins, or herbal medicines? no   Have you stopped taking any of your medications? Is so, why? -  no  Are you having any side effects from any of your medications? - no    Have you seen any other physician or provider since your last visit? PT   Have you had any other diagnostic tests since your last visit? yes    Have you been seen in the emergency room and/or had an admission in a hospital since we last saw you?  no   Have you had your routine dental cleaning in the past 6 months?  no     Do you have an active MyChart account? If no, what is the barrier?   Yes    Patient Care Team:  Jose Miguel Wilson MD as PCP - General (Family Medicine)    Medical History Review  Past Medical, Family, and Social History reviewed and does contribute to the patient presenting condition    Health Maintenance   Topic Date Due    Hepatitis C screen  Never done    Varicella vaccine (1 of 2 - 2-dose childhood series) Never done    Flu vaccine (1) 09/01/2020    DTaP/Tdap/Td vaccine (1 - Tdap) 06/25/2021 (Originally 3/3/2000)    Lipid screen  11/19/2025    HIV screen  Completed    Hepatitis A vaccine  Aged Out    Hepatitis B vaccine  Aged Out    Hib vaccine  Aged Out    Meningococcal (ACWY) vaccine  Aged Out    Pneumococcal 0-64 years Vaccine  Aged Out

## 2021-03-15 ENCOUNTER — TELEPHONE (OUTPATIENT)
Dept: FAMILY MEDICINE CLINIC | Age: 41
End: 2021-03-15

## 2021-03-15 NOTE — TELEPHONE ENCOUNTER
Attempted to work on an ENT referral for this pt. Called pt and asked him to call his insurance company and find out where he can go for ENT. Pt insisted that he already called last week and spoke with someone with a Drs. Name and fax number, I did not see any encounter stating this information. Pt stated his computer was downloading a program and he was not able to tell me the name of the ENT.   I advised pt to call me when he is able to locate the information I need to complete his referral.

## 2021-03-26 ENCOUNTER — HOSPITAL ENCOUNTER (OUTPATIENT)
Dept: GENERAL RADIOLOGY | Age: 41
Discharge: HOME | End: 2021-03-28
Payer: COMMERCIAL

## 2021-03-26 ENCOUNTER — HOSPITAL ENCOUNTER (OUTPATIENT)
Age: 41
Discharge: HOME | End: 2021-03-28
Payer: COMMERCIAL

## 2021-03-26 DIAGNOSIS — M79.641 RIGHT HAND PAIN: ICD-10-CM

## 2021-03-26 PROCEDURE — 73100 X-RAY EXAM OF WRIST: CPT

## 2021-04-05 ENCOUNTER — OFFICE VISIT (OUTPATIENT)
Dept: FAMILY MEDICINE CLINIC | Age: 41
End: 2021-04-05
Payer: COMMERCIAL

## 2021-04-05 ENCOUNTER — TELEPHONE (OUTPATIENT)
Dept: FAMILY MEDICINE CLINIC | Age: 41
End: 2021-04-05

## 2021-04-05 VITALS
TEMPERATURE: 97 F | WEIGHT: 162 LBS | DIASTOLIC BLOOD PRESSURE: 74 MMHG | SYSTOLIC BLOOD PRESSURE: 122 MMHG | HEART RATE: 71 BPM | BODY MASS INDEX: 23.24 KG/M2

## 2021-04-05 DIAGNOSIS — M25.531 WRIST PAIN, ACUTE, RIGHT: Primary | ICD-10-CM

## 2021-04-05 PROCEDURE — 1036F TOBACCO NON-USER: CPT | Performed by: STUDENT IN AN ORGANIZED HEALTH CARE EDUCATION/TRAINING PROGRAM

## 2021-04-05 PROCEDURE — G8420 CALC BMI NORM PARAMETERS: HCPCS | Performed by: STUDENT IN AN ORGANIZED HEALTH CARE EDUCATION/TRAINING PROGRAM

## 2021-04-05 PROCEDURE — G8427 DOCREV CUR MEDS BY ELIG CLIN: HCPCS | Performed by: STUDENT IN AN ORGANIZED HEALTH CARE EDUCATION/TRAINING PROGRAM

## 2021-04-05 PROCEDURE — 99213 OFFICE O/P EST LOW 20 MIN: CPT | Performed by: STUDENT IN AN ORGANIZED HEALTH CARE EDUCATION/TRAINING PROGRAM

## 2021-04-05 ASSESSMENT — ENCOUNTER SYMPTOMS
WHEEZING: 0
CHEST TIGHTNESS: 0
COUGH: 0
COLOR CHANGE: 0
SHORTNESS OF BREATH: 0
VOMITING: 0
RHINORRHEA: 0
DIARRHEA: 0
CONSTIPATION: 0
NAUSEA: 0
BLOOD IN STOOL: 0
SINUS PRESSURE: 0
ABDOMINAL PAIN: 0
SINUS PAIN: 0

## 2021-04-05 NOTE — PATIENT INSTRUCTIONS
Thank you for letting us take care of you today. We hope all your questions were addressed. If a question was overlooked or something else comes to mind after you return home, please contact a member of your Care Team listed below. Your Care Team at [place] [address] is Team #2  Meenakshi Redmond DO (Faculty)  Marii Ivan (Faculty)  Magalys Marcial MD (Resident)  Claudia Mcpherson MD (Resident)  Darcie Khan MD (Resident)  Rashid Saenz MD (Resident)  Cornelia Low MD (Resident)  NENITA Cabrera ,Uyen Hawkins ([address] [facility] office)  Bhanu Francois, [address] [facility] [address] (Clinical Practice Manager)  Arden Hui [facility] (Clinical Pharmacist)     Office phone number: [phone]    If you need to get in right away due to illness, please be advised we have \"Same Day\" appointments available Monday-Friday. Please call us at [phone] option #3 to schedule your \"Same Day\" appointment. Schedule a Vaccine  When you qualify to receive the vaccine, call the [facility]) COVID-19 Vaccination Hotline to schedule your appointment or to get additional information about the [facility]) locations which are offering the COVID-19 vaccine. To be 94% effective, it's important that you receive two doses of one of the COVID-19 vaccines. -If you are receiving the [employer] vaccine, your second shot will be scheduled as close to 21 days after the first shot as possible. -If you are receiving the Moderna vaccine, your second shot will be scheduled as close to 28 days after the first shot as possible. [facility]) COVID-19 Vaccination Hotline: [phone]    Links to [facility]) website and [facility] website:    [facility]. [email or web address]    [email or web address]

## 2021-04-05 NOTE — PROGRESS NOTES
Subjective: Jeanie Blue is a 37 y.o. male with  has a past medical history of Asthma. No family history on file. Presented tothe office today for:  Chief Complaint   Patient presents with    1 Month Follow-Up     patient states he is doing ok had surgery 6 days ago       HPI  Patient is a 45-year-old male presenting for follow-up of wrist pain  Patient had a fall on outstretched hand in November 2020  Subsequent wrist pain and proximal thumb pain  Initial x-rays were negative for scaphoid fracture  Has used a wrist brace with no relief  Was sent to physical therapy  Still having radial wrist pain on extension of the wrist  Does not radiate    Review of Systems   Constitutional: Negative for appetite change, chills, fatigue and fever. HENT: Negative for ear discharge, ear pain, rhinorrhea, sinus pressure and sinus pain. Respiratory: Negative for cough, chest tightness, shortness of breath and wheezing. Cardiovascular: Negative for chest pain and palpitations. Gastrointestinal: Negative for abdominal pain, blood in stool, constipation, diarrhea, nausea and vomiting. Genitourinary: Negative for dysuria and flank pain. Musculoskeletal: Negative for joint swelling, neck pain and neck stiffness. Wrist pain   Skin: Negative for color change and wound. Neurological: Negative for dizziness, light-headedness, numbness and headaches. Objective:    /74 (Site: Left Upper Arm, Position: Sitting, Cuff Size: Medium Adult)   Pulse 71   Temp 97 °F (36.1 °C) (Temporal)   Wt 162 lb (73.5 kg)   BMI 23.24 kg/m²    BP Readings from Last 3 Encounters:   04/05/21 122/74   03/11/21 121/72   01/18/21 106/70     Physical Exam  Vitals signs and nursing note reviewed. Constitutional:       Appearance: Normal appearance. He is well-developed. HENT:      Head: Normocephalic and atraumatic. Cardiovascular:      Rate and Rhythm: Normal rate and regular rhythm.       Heart sounds: Normal heart sounds. Pulmonary:      Effort: Pulmonary effort is normal. No respiratory distress. Breath sounds: Normal breath sounds. No wheezing. Musculoskeletal:      Right wrist: He exhibits normal range of motion, no tenderness, no deformity and no laceration. Comments: Negative Finkelstein's test; no pain elicited  Castrejon's test negative; no pain on radial deviation   Skin:     General: Skin is warm and dry. Neurological:      Mental Status: He is alert. Lab Results   Component Value Date    WBC 5.7 11/19/2020    HGB 15.1 11/19/2020    HCT 46.3 11/19/2020     11/19/2020    CHOL 170 11/19/2020    TRIG 67 11/19/2020    HDL 58 11/19/2020    ALT 13 11/19/2020    AST 13 11/19/2020     11/19/2020    K 4.1 11/19/2020     11/19/2020    CREATININE 0.74 11/19/2020    BUN 25 (H) 11/19/2020    CO2 28 11/19/2020     Lab Results   Component Value Date    CALCIUM 9.7 11/19/2020     Lab Results   Component Value Date    LDLCHOLESTEROL 99 11/19/2020       Assessment and Plan:    1. Wrist pain, acute, right  Although initial x-rays of the wrist were negative; scaphoid fracture may be a possibility  - MRI HAND RIGHT WO CONTRAST; Future  - SPLINT THUMB SPICA  Scheduled patient follow-up with sports medicine      Requested Prescriptions      No prescriptions requested or ordered in this encounter       There are no discontinued medications. Return in about 4 weeks (around 5/3/2021), or if symptoms worsen or fail to improve.

## 2021-04-05 NOTE — TELEPHONE ENCOUNTER
Pt had scheduled with Delmi Goncalves 4/7/2021 but cxled due to not being able to get Wrist MRI completed before 4/14/2021. He would like to be rescheduled with Delmi Goncalves anytime after 4/14/2021. Please call him back to reschedule.

## 2021-04-14 ENCOUNTER — HOSPITAL ENCOUNTER (OUTPATIENT)
Dept: MRI IMAGING | Facility: CLINIC | Age: 41
Discharge: HOME | End: 2021-04-16
Payer: COMMERCIAL

## 2021-04-14 DIAGNOSIS — M25.531 ACUTE WRIST PAIN, RIGHT: ICD-10-CM

## 2021-04-14 PROCEDURE — 73221 MRI JOINT UPR EXTREM W/O DYE: CPT

## 2021-04-21 ENCOUNTER — OFFICE VISIT (OUTPATIENT)
Dept: FAMILY MEDICINE CLINIC | Age: 41
End: 2021-04-21
Payer: COMMERCIAL

## 2021-04-21 VITALS
DIASTOLIC BLOOD PRESSURE: 68 MMHG | BODY MASS INDEX: 23.47 KG/M2 | WEIGHT: 163.6 LBS | HEART RATE: 64 BPM | SYSTOLIC BLOOD PRESSURE: 111 MMHG

## 2021-04-21 DIAGNOSIS — M65.4 DE QUERVAIN'S TENOSYNOVITIS, RIGHT: Primary | ICD-10-CM

## 2021-04-21 PROCEDURE — 99213 OFFICE O/P EST LOW 20 MIN: CPT | Performed by: FAMILY MEDICINE

## 2021-04-21 PROCEDURE — G8427 DOCREV CUR MEDS BY ELIG CLIN: HCPCS | Performed by: FAMILY MEDICINE

## 2021-04-21 PROCEDURE — 1036F TOBACCO NON-USER: CPT | Performed by: FAMILY MEDICINE

## 2021-04-21 PROCEDURE — G8420 CALC BMI NORM PARAMETERS: HCPCS | Performed by: FAMILY MEDICINE

## 2021-04-21 NOTE — PROGRESS NOTES
Narrative    Not on file       Current Outpatient Medications   Medication Sig Dispense Refill    diclofenac sodium (VOLTAREN) 1 % GEL Apply 2 g topically 2 times daily 50 g 1    doxepin (SINEQUAN) 25 MG capsule Take 1 capsule by mouth nightly (Patient not taking: Reported on 4/5/2021) 30 capsule 3     No current facility-administered medications for this visit. Allergies:  heis allergic to amoxicillin. ROS:  CV:  Denies chest pain; palpitations; shortness of breath; swelling of feet, ankles; and loss of consciousness. CON: Denies fever and dizziness. ENT:  Denies hearing loss / ringing, ear infections hoarseness, and swallowing problems. RESP:  Denies chronic cough, spitting up blood, and asthma/wheezing. GI: Denies abdominal pain, change in bowel habits, nausea or vomiting, and blood in stools. :  Denies frequent urination, burning or painful urination, blood in the urine, and bladder incontinence. NEURO:  Denies headache, memory loss, sleep disturbance, and tremor or movement disorder. PHYSICAL EXAM:    SKIN:  Intact without rashes, lesions or ulcerations. No obvious deformity or swelling. EYES:  Extraocular muscles intact. MOUTH: Oral mucosa moist.  No perioral lesions. PULM:  Respirations unlabored and regular. VASC:  Capillary refill less than 2 seconds. Hand/Wrist Exam  ROM:  Full flexor and extensor tendon function intact   Finger, hand, and wrist range of motion are WNL  Inspection-Deformity: no  Palpation-Tenderness: no  There is is not thenar and is not interosseous atrophy. Strength- WNL  NEURO: Radial, ulnar, and median nerves are intact to motor and sensory testing. Two point discrimination is less than six mm. There is not decreased sensation to light touch and pinprick in the median and ulnar nerve distribution. CTS: Tinel's test at the wrist is negative. Flexion compression test negative  Phalen's test is negative.    Finkelstein's test is negative. Elbow:  Range of motion and strength about the elbow is intact. Tinel's test is negative at the elbow. Cerv:  Full pain free range of motion with a negative Spurling's test.    RADIOLOGY: No results found. IMPRESSION:     1. De Quervain's tenosynovitis, right        PLAN:   We discussed some of the etiologies and natural histories of     ICD-10-CM    1. De Quervain's tenosynovitis, right  M65.4 [facility]     diclofenac sodium (VOLTAREN) 1 % GEL     DISCONTINUED: diclofenac sodium (VOLTAREN) 1 % GEL     We discussed the various treatment alternatives including anti-inflammatory medications, physical therapy, injections, further imaging studies and as a last resort surgery. Return in about 3 months (around 7/21/2021), or if symptoms worsen or fail to improve.     Please be aware portions of this note were completed using voice recognition software and unforeseen errors may have occurred    Anthony Thomas MD  PGY-3 FM Resident          Procedures   [address] [place], [place] [address]

## 2021-04-21 NOTE — PROGRESS NOTES
Visit Information    Have you changed or started any medications since your last visit including any over-the-counter medicines, vitamins, or herbal medicines? no   Have you stopped taking any of your medications? Is so, why? -  no  Are you having any side effects from any of your medications? - no    Have you seen any other physician or provider since your last visit?  no   Have you had any other diagnostic tests since your last visit? yes - MRI wrist 4/14   Have you been seen in the emergency room and/or had an admission in a hospital since we last saw you?  no   Have you had your routine dental cleaning in the past 6 months?  no     Do you have an active MyChart account? If no, what is the barrier?   Yes    Patient Care Team:  Liam Rivas MD as PCP - General (Family Medicine)  Roel Rios MD as PCP - [facility]    Medical History Review  Past Medical, Family, and Social History reviewed and does not contribute to the patient presenting condition    Health Maintenance   Topic Date Due    Hepatitis C screen  Never done    Varicella vaccine (1 of 2 - 2-dose childhood series) Never done    COVID-19 Vaccine (1) Never done    DTaP/Tdap/Td vaccine (1 - Tdap) 06/25/2021 (Originally 3/3/2000)    Flu vaccine (Season Ended) 09/01/2021    Lipid screen  11/19/2025    HIV screen  Completed    Hepatitis A vaccine  Aged Out    Hepatitis B vaccine  Aged Out    Hib vaccine  Aged Out    Meningococcal (ACWY) vaccine  Aged Out    Pneumococcal 0-64 years Vaccine  Aged Out

## 2021-04-22 ENCOUNTER — TELEPHONE (OUTPATIENT)
Dept: FAMILY MEDICINE CLINIC | Age: 41
End: 2021-04-22

## 2021-04-22 NOTE — TELEPHONE ENCOUNTER
[facility] called wanting to change the patient physical therapy to occupational therapy.  Thank you

## 2021-04-22 NOTE — PROGRESS NOTES
I performed a history and physical examination of the patient and discussed management with the resident. I reviewed the residents note and agree with the documented findings and plan of care. Any areas of disagreement are noted on the chart. I have personally evaluated this patient and have completed at least one if not all key elements of the E/M (history, physical exam, and MDM). Additional findings are as noted. I agree with the chief complaint, past medical history, past surgical history, allergies, medications, social and family history as documented unless otherwise noted below.      Electronically signed by Terris Fleischer, DO on 4/22/2021 at 12:12 PM

## 2021-04-23 ENCOUNTER — IMMUNIZATION (OUTPATIENT)
Dept: FAMILY MEDICINE CLINIC | Age: 41
End: 2021-04-23
Payer: COMMERCIAL

## 2021-04-23 PROCEDURE — 0001A COVID-19, PFIZER VACCINE 30MCG/0.3ML DOSE: CPT | Performed by: INTERNAL MEDICINE

## 2021-04-23 PROCEDURE — 91300 COVID-19, PFIZER VACCINE 30MCG/0.3ML DOSE: CPT | Performed by: INTERNAL MEDICINE

## 2021-05-05 DIAGNOSIS — M65.4 DE QUERVAIN'S TENOSYNOVITIS, RIGHT: ICD-10-CM

## 2021-05-05 DIAGNOSIS — M95.8 WINGED SCAPULA OF LEFT SIDE: ICD-10-CM

## 2021-05-05 DIAGNOSIS — M54.50 ACUTE LOW BACK PAIN WITHOUT SCIATICA, UNSPECIFIED BACK PAIN LATERALITY: Primary | ICD-10-CM

## 2021-05-14 ENCOUNTER — IMMUNIZATION (OUTPATIENT)
Dept: FAMILY MEDICINE CLINIC | Age: 41
End: 2021-05-14
Payer: COMMERCIAL

## 2021-05-14 PROCEDURE — 91300 COVID-19, PFIZER VACCINE 30MCG/0.3ML DOSE: CPT | Performed by: INTERNAL MEDICINE

## 2021-05-14 PROCEDURE — 0002A COVID-19, PFIZER VACCINE 30MCG/0.3ML DOSE: CPT | Performed by: INTERNAL MEDICINE

## 2021-05-19 ENCOUNTER — HOSPITAL ENCOUNTER (OUTPATIENT)
Dept: OCCUPATIONAL THERAPY | Age: 41
Setting detail: THERAPIES SERIES
Discharge: HOME | End: 2021-05-19
Payer: COMMERCIAL

## 2021-05-19 ENCOUNTER — HOSPITAL ENCOUNTER (OUTPATIENT)
Dept: PHYSICAL THERAPY | Age: 41
Setting detail: THERAPIES SERIES
Discharge: HOME | End: 2021-05-19
Payer: COMMERCIAL

## 2021-05-19 PROCEDURE — 97140 MANUAL THERAPY 1/> REGIONS: CPT

## 2021-05-19 PROCEDURE — 97162 PT EVAL MOD COMPLEX 30 MIN: CPT

## 2021-05-19 PROCEDURE — 97110 THERAPEUTIC EXERCISES: CPT

## 2021-05-19 PROCEDURE — 97165 OT EVAL LOW COMPLEX 30 MIN: CPT

## 2021-05-19 NOTE — CONSULTS
[x] [facility]  Outpatient Physical Therapy  [address]  Phone: [phone]  Fax: [phone] [] [facility] at [address] [place] [address] Rd  Phone: [phone]   Fax: [phone]     Physical Therapy Evaluation    Date:  2021  Patient: Belen Brownlee  : 1980  MRN: [id number]  Physician: Eleuterio Marcial MD   Insurance: [facility] 30 visits  Medical Diagnosis: Acute low back pain without sciatica, unspecified back pain laterality (M54.5 [ICD-10-CM]); Winged scapula of left side (M95.8 [ICD-10-CM])    Rehab Codes: M54.6, M54.5   Onset Date: 21                                 Next 's appt:     Subjective:   CC: Patient presents with chronic low back pain and muscular tightness. He also reports intermittent scapular and cervical pain. Patient reports he exercises daily and often feels the need to stretch his low back throughout the day. Patient has difficulty ambulating and standing for long periods of time. Patient is also having pain in his R wrist that started after a [address] Started OT today for wrist injury. Has difficutly putting direct pressure in his wrist.  HPI:  Patient was treated for Left scapular issues and knee pain in PT last fall. Patient has tried bracing for his wrist pain but it has not improved. PMHx: [] Unremarkable [] Diabetes [] HTN  [] Pacemaker   [] MI/Heart Problems [] Cancer [x] Arthritis [] Asthma                         [x] refer to full medical chart  In [facility]  [] Other:        Comorbidities:   [] Obesity [] Dialysis  [] Other:   [] Asthma/COPD [] Dementia [] Other:   [] Stroke [] Sleep apnea [] Other:   [] Vascular disease [] Rheumatic disease [] Other:     Tests: [] X-Ray: [x] MRI:  [] Other:  No specific abnormality identified to account for the patient's symptoms.       Small ganglion cyst at the volar margin of the distal radius measuring 5 x 3   x 4 mm.        Medications: [x] Refer to full medical record [] None [] Other:  Allergies:      [x] Refer to full medical record  [] None [] Other:      Working:  [] Full-time  [] Part-time  [] Off d/t condition  [] Retired  [] Disability  [] N/A  Job/Employer:     Pain:  [x] Yes  [] No Location: Low back  Pain Rating: (0-10 scale)  5-9/10  Pain altered Tx:  [] Yes  [x] No  Action:    Objective: p = pain, L = Lacks      ROM  ° A/P STRENGTH  ROM    Left Right Left Right Cervical    Shoulder Flex Full Full   Flexion    Abduction Full Full   Extension    ER Full Full   Rotation L R    IR Full Full   Side bending L R   Elbow Flex          Ext          Wrist Flex         Ext     Lumbar    Hand      Flexion full   Hip Flexion    4/5 4/5 Extension full   Ext   4/5 4/5 Rotation L full R full   Abd   4/5 4/5 Sidebend L full R full   Add     -------------------- --------- --------   ER     STRENGTH     IR     Abdominals 4-/5    Knee Flex     Erector Spinae 4-/5    Ext      Scapular L R   Ankle DF     -Scaption 4/5 4+/5   PF      -Retraction     Inversion     -Horz Abd 4/5 4+/5   Eversion     -Extension        Special Tests:   Positive:        Negative: R miguelling's     Functional Tests:   Low plank: held 20 seconds, increased back pain. Unable to perform High plank due to wrist pain   Double leg lift: very difficult to maintain and caused back pain. Bilateral shoulder flexion: left scapular dyskinesis       OBSERVATION Comments   Posture Mild fwd head and rounded shoulders   Joint Alignment No Deficit    Gait No Deficit    Palpation Diffuse tension felt in lumbar paraspinals   Edema No Deficit    Neurological No Deficit      Assessment: Patient demonstrates lumbar back pain due to core weakness and over active lumbar extensors. Patient also demonstrates left scapular weakness and poor coordination. Problems:    [x] ? Pain: Low back   [x] ? Flexibility: lumbar extensors   [x] ? Strength: abs, glutes, lumbar extensors   [x] ?  Function:Oswestry score 44% Impaired  [] Up with Straight Leg - 1 x daily - 7 x weekly - 10 reps - 3 sets  Single Leg Bridge - 1 x daily - 7 x weekly - 10 reps - 3 sets  Prone Single Arm Shoulder Y - 1 x daily - 7 x weekly - 10 reps - 3 sets  Prone Shoulder Horizontal Abduction - 1 x daily - 7 x weekly - 3 sets - 10 reps    Specific Instructions for next treatment[de-identified] moderate to advanced core strengthening,  Bird dogs. banded w ay hip, paloff press, Cybex rows, try planks on window or side of plinthe if tolerated. Evaluation Complexity:  History (Personal factors, comorbidities) [] 0 [x] 1-2 [] 3+   Exam (limitations, restrictions) [] 1-2 [x] 3 [] 4+   Clinical presentation (progression) [] Stable [x] Evolving  [] Unstable   Decision Making [] Low [x] Moderate [] High    [] Low Complexity [x] Moderate Complexity [] High Complexity       Treatment Charges: Mins Units   [x] Evaluation       []  Low       [x]  Moderate       []  High 30 1   []  Modalities     [x]  Ther Exercise 15 1   []  Manual Therapy     []  Ther Activities     []  Aquatics     []  Vasocompression     []  Other       TOTAL TREATMENT TIME: 39      Time in:1350     Time out:1435    Electronically signed by: Pj Rivera PT        Physician Signature:________________________________Date:__________________  By signing above or cosigning this note, I have reviewed this plan of care and certify a need for medically necessary rehabilitation services.      *PLEASE SIGN ABOVE AND FAX BACK ALL PAGES*

## 2021-05-19 NOTE — CONSULTS
[x] [facility] Out[facility]       Occupational Therapy 1st floor       [address] [place], [place]         Phone: [phone]       Fax: [phone] [] [place] [address] Occupational Therapy  [address], [place]  Phone: [phone]  Fax: [phone]       Occupational Therapy Hand & Upper Extremity  Initial Evaluation    Date: 2021      Patient: Paris Fajardo  : 1980  MRN: [id number]  Referring Provider:  Ravindra Strickland MD  Insurance: Other Generic [employer] Insurance [facility]     Medical Diagnosis: DeQuervains Tenosynovis R M65.4   Rehab Codes: pain in hand M79.646,, pain in wrist M25.53,, stiffness in hand M25.64,, stiffness in wrist M25.63,, muscle weakness generalized M62.81, or history of falls Z91.81,  Onset Date: 10-1-20    Next Dr. Ambar Ayala: TBD    Subjective:   CC: S/P fall from standing height. HPI: (onset date) Pt has orthostatic hypotension and hopped up to get the door, then passed out. Mechanism of Injury: [address]  Surgery Date: NA Precautions: None    Involved Extremity: Right   Dominant Extremity: Right    Past Medical History: Unremarkable and Arthritis          Comorbidities: NA    Medications: Refer to Medical chart in Epic Allergies:  None    Pain: Intensity: 1  /10 Location: distal forearm, radial side     Pain Type: Intermittant  Pain Altered Tx: no  Action Taken:none            Home Environment:    Pt lives in a  and [address]  The washing machine is on and [employer]    Vocation    Job Status [x]Normal duty []Light duty [] Off due to condition []Retired  []Not employed []Disability  []Other:  []  Return to work:    Work activities/duties Editing and photos     Avocational Activities     [] [address]     [] Grandparenting     [] Care giver [] OTHER    [x] NA       ADL/IADL Previous level of function Current level of function Who assists or modifications made or needed   Bathing  [x] Independent    [] Assist  [x] strength (pounds);  a. R  strength to 70 for increased I with packing for move in the fall  b. R lateral pinch to 23 for increased I with use of camera for work related purposes  c. R 2 point pinch to 15 for increased I with typing for computer tasks  d. R 3 jaw char for increased I with cooking tasks  3. Increase function:UE Functional Index Score 74 or more points to promote increased functional abilities  4. Demo knowledge of fall prevention in 3 sessions. 5. Patient to be independent with home exercise program as demonstrated by performance with correct form without cues. Long Term Goals: (  12  Treatments)  1. Tolerate ionto pending doctor approval  2. ? R  strength to 75 for increased I with packing/moving    Patient Goals: ability to put weight on my palm without pain, the ability to carry things without pain as result. Treatment Potential: Good Suggested Professional Referral: Yes  HAND SPECIALIST  Domestic Concerns: No   Barriers to Goal Achievement: No      Comments/Assessment: Patient had a fall on outstretched hand in November 2020 with subsequent wrist pain and proximal thumb pain. Initial x-rays were negative for scaphoid fracture. Has used a wrist brace with no relief and was sent to physical therapy. Pt reports still having radial wrist pain on extension of the wrist. Per doctors note pt has normal ROM, no tenderness, no deformity and no laceration. Pt negative Finkelstein's test and negative Castrejon's test. Per MRI results pt has no definite tear of SL joint or LT jt. No evidence of TFCC tear, extensor and flexion tendons are intact, no acute fracture or bone marrow edema and no soft tissue swelling is seen. Pt does have a small ganglion at the volar margin of the distal radius measuring 5 x 3 x 4 mm and mild cystic change is seen in the triquetrum at the pisiform triquetrum joint, suggesting mild degenerative change.  Pt could benefit from Iontophoresis for decrease of local tissue inflammation and damage. Waveform: DC, Dosage: 40-80 mA min, Intensity: Up to 4-5 mA, Duration: 10-20 mins depending on dosage and intensity. Allergies discussed with pt and pt identifier confirmed by statement of full name and birthdate. Pt could also benefit from a referral to a Hand Specialist for further investigation of pain. Pt scheduling difficult as pt states his circadian rhythm has an extra 15 minutes and he is unable to wake up at the correct time (late by 15 mins) this week. (A circadian rhythm, or circadian cycle, is a natural, internal process that regulates the sleep-wake cycle and repeats roughly every 24 hours. It can refer to any process that originates within an organism and responds to the environment.) Pt to be seen by PT on second visit as OT dept will be closed at that later hour. Home Program Initiated: Verbal Comprehension of Education: Yes       Plans, Goals, Risks, Benefits, Discussed with and Patient    Treatment Plan:  Therapeutic Ex 13770, Therapeutic Act 15951, Massage 42081, Manual 88222, Cold/Hot Pack, Ultrasound 86943 and Iontophoresis (4mg/mL dexamethasone sodium phosphate 40-120mA min) 89759    [x]  Medication allergies reviewed for use of    Dexamethasone Sodium Phosphate 4mg/ml     with iontophoresis treatments. Pt is not allergic. Treatment Plan:  Frequency: 2 x/weeks for 12 visits     Today's Treatment:  Modalities:  Precautions:    Exercise Flow Sheet:  Exercise Reps/Time Weight/Level Comments   Manual ROM   Added this date for end range                                 Other:     Specific Instructions for Next Treatment:     Evaluation Complexity:  History (Personal factors, comorbidities) [x]  0 []  1-2 []  3+   Exam (limitations, restrictions) [x]  1-2 []  3 []  4+   Decision Making [x]  Low []  Moderate []  High   ?  [x]  Low Complexity []  Moderate   Complexity []  High Complexity      Treatment Charges: Mins Units Time In/Out   Evaluation  []  High  []

## 2021-05-20 ENCOUNTER — TELEPHONE (OUTPATIENT)
Dept: FAMILY MEDICINE CLINIC | Age: 41
End: 2021-05-20

## 2021-05-20 NOTE — TELEPHONE ENCOUNTER
Jonna from OT at [facility] did all the testing for DeQuervain and all the testing was negative nothing bothered him. She believes patient needs to see a hand surgeon.   Please review

## 2021-05-25 ENCOUNTER — HOSPITAL ENCOUNTER (OUTPATIENT)
Dept: PHYSICAL THERAPY | Age: 41
Setting detail: THERAPIES SERIES
Discharge: HOME | End: 2021-05-25
Payer: COMMERCIAL

## 2021-05-25 PROCEDURE — 97110 THERAPEUTIC EXERCISES: CPT

## 2021-05-25 NOTE — FLOWSHEET NOTE
Mins Units   []  Modalities     [x]  Ther Exercise 55 4   []  Manual Therapy     []  Ther Activities     []  Aquatics     []  Vasocompression     []  Other     Total Treatment time 55 4       Assessment: [x] Progressing toward goals. Patient demonstrates good tolerance to cybex exercises to improve posterior chain strength. [] No change. [x] Other: Patient had difficulty with cat/camel mobility exercises due to poor coordination. Utilized verbal, tactile, and visual cues from [address] [place] [address] to correct. [x] Patient would continue to benefit from skilled physical therapy services in order to: improve core strength and reduce back pain. STG/LTG  STG: (to be met in 8 treatments)   1. ? Pain: 3/10 low back pain with standing activity. 2. ? Strength: 4+/5 left scapular strength to improve coordination and reduce winging. 3. ? Function: Oswestry score of 24% impaired or better to improve ADLs. 4. Independent with Home Exercise Programs     LTG: (to be met in 12 treatments)  1. Patient reports increased standing tolerance due to improved back pain. 2. Patient is able to hold a low plank for 1 min. Pt. Education:  [x] Yes  [] No  [x] Reviewed Prior HEP/Ed, Discussed using bands to complete paloff press at home. Method of Education: [x] Verbal  [] Demo  [] Written  Comprehension of Education:  [x] Verbalizes understanding. [x] Demonstrates understanding. [x] Needs review. [] Demonstrates/verbalizes HEP/Ed previously given. Plan: [x] Continue current frequency toward long and short term goals. [x] Specific Instructions for subsequent treatments: add 4 way hip.        Time In:0800            Time Out: 2144    Electronically signed by:  Johnson Wilcox, PT

## 2021-05-27 ENCOUNTER — HOSPITAL ENCOUNTER (OUTPATIENT)
Dept: OCCUPATIONAL THERAPY | Age: 41
Setting detail: THERAPIES SERIES
Discharge: HOME | End: 2021-05-27
Payer: COMMERCIAL

## 2021-05-27 ENCOUNTER — HOSPITAL ENCOUNTER (OUTPATIENT)
Dept: PHYSICAL THERAPY | Age: 41
Setting detail: THERAPIES SERIES
Discharge: HOME | End: 2021-05-27
Payer: COMMERCIAL

## 2021-05-27 PROCEDURE — 97110 THERAPEUTIC EXERCISES: CPT

## 2021-05-27 PROCEDURE — 97033 APP MDLTY 1+IONTPHRSIS EA 15: CPT

## 2021-05-27 PROCEDURE — 97035 APP MDLTY 1+ULTRASOUND EA 15: CPT

## 2021-05-27 NOTE — FLOWSHEET NOTE
[x] [facility]       Occupational Therapy            1st floor       [address], [place]         Phone: [phone]       Fax: [phone] [] [facility] Occupational Therapy  [address] [place], [place]  Phone: [phone]  Fax: [phone]      Occupational Therapy Daily Treatment Note    Date:  2021  Patient Name:  Ricky Kim    :  1980  MRN: [id number]  Referring Provider:  Adrian Hicks MD  Insurance: Other Generic Auto Insurance [facility]     Medical Diagnosis: DeQuervains Tenosynovis R M65.4      Rehab Codes: pain in hand M79.646,, pain in wrist M25.53,, stiffness in hand M25.64,, stiffness in wrist M25.63,, muscle weakness generalized M62.81, or history of falls Z91.81,  Onset Date: 10-1-20                Next Dr. Solano Shadow: TBD  Visit# / total visits: ; Progress note for patient due at visit 6    Cancels/No Shows: 0/0      Subjective:    Pain:  [] Yes  [x] No Location: R wrist  Pain Rating: (0-10 scale) 0/10 at rest, pain increases with activity   Pain altered Tx:  [x] No  [] Yes  Action: ultrasound, ionto, massage   Pt Comments: pt with no new complaints upon arrival. Pt reports that wrist pain increased during physical therapy session. Objective:  Modalities:  Modality Flow Sheet:  START STOP Tx Modality   2021  Iontophoresis for decrease of local tissue inflammation and damage. Waveform: DC, Dosage: 40-80 mA min, Intensity: Up to 4-5 mA, Duration: 10-20 mins depending on dosage and intensity. Allergies discussed with pt and pt identifier confirmed by statement of full name and birthdate.     Treatments administered: 2021  Ultrasound: _.8__ W/cm2 x __8_ mins  Duty factor: _X_100%  __50%  __33% __20%  Head size:    __2_ cm  MHz: __1mHz  _X_3mHz  Location: base of R thumb     Hot Pack:     Cold Pack:         Precautions: NA  Exercises:  EXERCISE    REPS/     TIME  WEIGHT/    LEVEL COMMENTS   Soft tissue massage  8 minutes   Administered - base of R thumb                                         Other: NA      Treatment Charges: Mins Units   [x]  Modalities:  Iontophoresis     19   1   [x]  Ultrasound 8 1   [x]  Ther Exercise 8 1   []  Manual Therapy     []  Ther Activities     []  Orthotic fit/train     []  Orthotic recheck     []  Other     Total Treatment time 35 3         Assessment: [x] Progressing toward goals. Pt asking many detailed questions about ionto treatment, stated he was going to do some research about the treatment. Extended time spent educating pt about modalities that were used during treatment session. Pt did not demo any visible distress or signs of pain throughout treatment session. [] No change. [] Other     [x] Patient would continue to benefit from skilled occupational therapy services in order to: Improve  functional /grasp, Strength and Complaint of pain in order to improve functional use of UE in ADL performance    Short Term Goals: (6  Treatments)  1. Decrease Pain: to 1/10 with pushing open doors (wrist extension)  2. Increase strength (pounds);  a. R  strength to 70 for increased I with packing for move in the fall  b. R lateral pinch to 23 for increased I with use of camera for work related purposes  c. R 2 point pinch to 15 for increased I with typing for computer tasks  d. R 3 jaw char for increased I with cooking tasks  3. Increase function:UE Functional Index Score 74 or more points to promote increased functional abilities  4. Demo knowledge of fall prevention in 3 sessions. 5. Patient to be independent with home exercise program as demonstrated by performance with correct form without cues.     Long Term Goals: (  12  Treatments)  1. Tolerate ionto pending doctor approval  2. ? R  strength to 75 for increased I with packing/moving     Patient Goals: ability to put weight on my palm without pain, the ability to carry things without pain as result.       Pt. Education:  [x] Yes  [] No  [] Reviewed Prior HEP/Ed  Method of Education: [x] Verbal  [] Demo  [] Written  Re: benefits of ionto, ultrasound   Comprehension of Education:  [x] Verbalizes understanding. [] Demonstrates understanding. [] Needs review. [] Demonstrates/verbalizes HEP/Ed previously given. Plan: [x] Continue current frequency toward short and long term goals.   [x] Specific Instructions for subsequent treatments: ionto, ultrasound, strengthening    [] Other:       Time In: 3029  Time Out: 5857        Electronically signed by:  Amadeo Elena OTR/L

## 2021-05-28 NOTE — TELEPHONE ENCOUNTER
Carnification on original message from the therapist : John Ivey from OT at [facility] did all the testing for DeQuervain and all the testing was negative nothing bothered him. This test was originally ordered by Dr. Perla Javed on 05/05/21. She believes patient needs to see a hand surgeon because something is definetley wrong but nothing showed up in the test that was ordered.   Please review and advise

## 2021-05-31 NOTE — TELEPHONE ENCOUNTER
I had reviewed the MRI that was done 4/14/21 with Dr. Lalita Worthington. We discussed the MRI with the patient in the office. In our medical opinion, there was no indication for referral to surgery based on our clinical and radiological findings. If patient would like to discuss this further, please have him schedule a follow-up appointment with Dr. Lalita Worthington in the office. I will not be sending a referral to hand surgery at this time. Thank you.

## 2021-06-01 ENCOUNTER — HOSPITAL ENCOUNTER (OUTPATIENT)
Dept: OCCUPATIONAL THERAPY | Age: 41
Setting detail: THERAPIES SERIES
Discharge: HOME | End: 2021-06-01
Payer: COMMERCIAL

## 2021-06-01 ENCOUNTER — HOSPITAL ENCOUNTER (OUTPATIENT)
Dept: PHYSICAL THERAPY | Age: 41
Setting detail: THERAPIES SERIES
Discharge: HOME | End: 2021-06-01
Payer: COMMERCIAL

## 2021-06-01 PROCEDURE — 97033 APP MDLTY 1+IONTPHRSIS EA 15: CPT

## 2021-06-01 PROCEDURE — 97110 THERAPEUTIC EXERCISES: CPT

## 2021-06-01 PROCEDURE — 97140 MANUAL THERAPY 1/> REGIONS: CPT

## 2021-06-01 NOTE — FLOWSHEET NOTE
[x] [facility]       Occupational Therapy            1st floor       [address], [place]         Phone: [phone]       Fax: [phone] [] Alejandrina Dominique Occupational Therapy  [address], [place]  Phone: [phone]  Fax: [phone]      Occupational Therapy Daily Treatment Note    Date:  2021  Patient Name:  Yoko Durham    :  1980  MRN: [id number]  Referring Provider:  Tabatha Serna MD  Insurance: Other Generic Auto Insurance [facility]     Medical Diagnosis: DeQuervains Tenosynovis R M65.4      Rehab Codes: pain in hand M79.646,, pain in wrist M25.53,, stiffness in hand M25.64,, stiffness in wrist M25.63,, muscle weakness generalized M62.81, or history of falls Z91.81,  Onset Date: 10-1-20                Next Dr. Deng Hy: TBD  Visit# / total visits: 3/12; Progress note for patient due at visit 6    Cancels/No Shows: 0/0      Subjective:    Pain:  [] Yes  [x] No Location: R wrist  Pain Rating: (0-10 scale) 0/10 at rest, pain increases with activity Pain reached 7/10 in Physical Therapy this date (putting weight on it)  Pain altered Tx:  [x] No  [] Yes  Action: ultrasound, ionto, massage   Pt Comments: Pt reports that wrist pain increased during physical therapy session. Objective:  Modalities:  Modality Flow Sheet:  START STOP Tx Modality   2021  Iontophoresis for decrease of local tissue inflammation and damage. Waveform: DC, Dosage: 40-80 mA min, Intensity: Up to 4-5 mA, Duration: 10-20 mins depending on dosage and intensity. Allergies discussed with pt and pt identifier confirmed by statement of full name and birthdate.     Treatments administered: 2021  Ultrasound: _.8__ W/cm2 x __8_ mins  Duty factor: _X_100%  __50%  __33% __20%  Head size:    __2_ cm  MHz: __1mHz  _X_3mHz  Location: base of R thumb     Hot Pack:     Cold Pack:         Precautions: NA  Exercises:  EXERCISE    REPS/     TIME  WEIGHT/    LEVEL COMMENTS Soft tissue massage  8 minutes   Administered - base of R thumb                                         Other: Patient with no complaints of pain during session. Patient with minimal redness at electrode sites after ionto treatment. Patient reported minimal pain at base of thumb when biking on recumbent bicycle. Patient informed to watch redness on arm which should resolve in 30 minutes. Treatment Charges: Mins Units   [x]  Modalities:  Iontophoresis     20   1   [x]  Ultrasound 8 0   []  Ther Exercise     [x]  Manual Therapy 8 1   []  Ther Activities     []  Orthotic fit/train     []  Orthotic recheck     []  Other     Total Treatment time 36          Assessment: [x] Progressing toward goals. Pt asking many detailed questions about ionto treatment, stated did research about ionto treatment. Pt no noted skin integrity issues or pain throughout treatment session. [] No change. [] Other     [x] Patient would continue to benefit from skilled occupational therapy services in order to: Improve  functional /grasp, Strength and Complaint of pain in order to improve functional use of UE in ADL performance    Short Term Goals: (6  Treatments)  1. Decrease Pain: to 1/10 with pushing open doors (wrist extension)  2. Increase strength (pounds);  a. R  strength to 70 for increased I with packing for move in the fall  b. R lateral pinch to 23 for increased I with use of camera for work related purposes  c. R 2 point pinch to 15 for increased I with typing for computer tasks  d. R 3 jaw char for increased I with cooking tasks  3. Increase function:UE Functional Index Score 74 or more points to promote increased functional abilities  4. Demo knowledge of fall prevention in 3 sessions. 5. Patient to be independent with home exercise program as demonstrated by performance with correct form without cues.     Long Term Goals: (  12  Treatments)  1.  Tolerate ionto pending doctor approval  2. ? R  strength to 75 for increased I with packing/moving     Patient Goals: ability to put weight on my palm without pain, the ability to carry things without pain as result. Pt. Education:  [x] Yes  [] No  [] Reviewed Prior HEP/Ed  Method of Education: [x] Verbal  [] Demo  [] Written  Re: benefits of ionto, ultrasound   Comprehension of Education:  [x] Verbalizes understanding. [] Demonstrates understanding. [] Needs review. [] Demonstrates/verbalizes HEP/Ed previously given. Plan: [x] Continue current frequency toward short and long term goals.   [x] Specific Instructions for subsequent treatments: ionto, ultrasound, strengthening (when applicable)   [] Other:       Time In: 7698  Time Out: 0656        Electronically signed by:  GIULIA Jane/EVETTE/TRISTON

## 2021-06-01 NOTE — FLOWSHEET NOTE
[x] [facility]) [facility] &  Therapy  [address]  [phone]  F: [phone] [] [address]  [address]   [address]  P: [phone]  F: [phone] [] [place]  [address]  P: [phone]  F: [phone] [] [address]  P: [phone]  F: [phone] [] [address]  [facility]   [address]   [place]: [phone]  F: [phone]      Physical Therapy Daily Treatment Note    Date:  2021  Patient Name:  Francesca Valerio    :  1980  MRN: [id number]  Physician: Ebony Miller MD                Insurance: [facility] 30 visits  Medical Diagnosis: Acute low back pain without sciatica, unspecified back pain laterality (M54.5 [ICD-10-CM]); 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 Patient has ordered new pillow and hopes this will help his neck pain. Objective:  Modalities:   Precautions:  Exercises:  Exercise Reps/ Time Weight/ Level x Comments   Elliptical 5 min  L3 x    Standing    Wrist brace worn as needed.     Cybex Cables       Seated lat pull down  2x12x 5 pl x    Standing lat pull down  2x15x 2 pl  x    Standing row  2x15x  5 pl  x    Standing Single arm row 2x10x 3 pl x    Paloff Rotation 10xea 3 pl  x Used 45-45 rotation today   FW OH press  2x10x 10 lb  x           Power strides 2x15x ea 15 lb  x Hold weight on contralateral side of

## 2021-06-03 ENCOUNTER — HOSPITAL ENCOUNTER (OUTPATIENT)
Dept: PHYSICAL THERAPY | Age: 41
Setting detail: THERAPIES SERIES
Discharge: HOME | End: 2021-06-03
Payer: COMMERCIAL

## 2021-06-03 ENCOUNTER — HOSPITAL ENCOUNTER (OUTPATIENT)
Dept: OCCUPATIONAL THERAPY | Age: 41
Setting detail: THERAPIES SERIES
Discharge: HOME | End: 2021-06-03
Payer: COMMERCIAL

## 2021-06-03 PROCEDURE — 97110 THERAPEUTIC EXERCISES: CPT

## 2021-06-03 PROCEDURE — 97035 APP MDLTY 1+ULTRASOUND EA 15: CPT

## 2021-06-03 NOTE — FLOWSHEET NOTE
[x] [facility]) [facility] &  Therapy  [address]  [phone]  F: [phone] [] [address]  [address]   [address]  P: [phone]  F: [phone] [] [place]  [address]  P: [phone]  F: [phone] [] [address]  P: [phone]  F: [phone] [] [address]  [facility]   [address]   [place]: [phone]  F: [phone]      Physical Therapy Daily Treatment Note    Date:  6/3/2021  Patient Name:  Maria De Jesus Owens    :  1980  MRN: [id number]  Physician: Braden Ricketts MD                Insurance: [facility] 30 visits  Medical Diagnosis: Acute low back pain without sciatica, unspecified back pain laterality (M54.5 [ICD-10-CM]); 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 Objective:  Modalities:   Precautions:  Exercises:  Exercise Reps/ Time Weight/ Level x Comments   Elliptical 5 min  L3 x    Standing    Wrist brace worn as needed.     Cybex Cables       Seated lat pull down  2x12x 5 pl x    Standing lat pull down  2x15x 2 pl  x    Standing row  2x15x  5 pl  x    Standing Single arm row 2x10x 3 pl x    Paloff Rotation 10xea 3 pl  x Used 45-45 rotation today   FW OH press  2x10x 10 lb  x    Kneeling lat stretch on black table  3x20\"  x           Power strides 2x15x ea 15 lb  x Hold weight on contralateral side of stepping leg   Goblet squats  2x15x ea 15 lb  x    2 way hip ext /abd  x ea Blue      Reverse lunge  20x ea  x           Prone Row on TG  2x10x 10 lb            Mat        Bird dogs 10x      Cat/ camel  5x   difficult    SL bridges 10x ea             Other:      Treatment Charges: Mins Units   []  Modalities     [x]  Ther Exercise 55 4   []  Manual Therapy     []  Ther Activities     []  Aquatics     []  Vasocompression     []  Other     Total Treatment time 55 4       Assessment: [x] Progressing toward goals. Held seated and standing lat pull downs due to ongoing soreness. Initiated kneeling lat stretch to improve latissimus mobility and thoracic extension. [] No change. [x] Other:   [x] Patient would continue to benefit from skilled physical therapy services in order to: improve core strength and reduce back pain. STG/LTG  STG: (to be met in 8 treatments)   1. ? Pain: 3/10 low back pain with standing activity. 2. ? Strength: 4+/5 left scapular strength to improve coordination and reduce winging. 3. ? Function: Oswestry score of 24% impaired or better to improve ADLs. 4. Independent with Home Exercise Programs     LTG: (to be met in 12 treatments)  1. Patient reports increased standing tolerance due to improved back pain. 2. Patient is able to hold a low plank for 1 min. Pt. Education:  [x] Yes  [] No  [x] Reviewed Prior HEP/Ed, Discussed using bands to complete paloff press at home. Method of Education: [x] Verbal  [] Demo  [] Written  Comprehension of Education:  [x] Verbalizes understanding. [x] Demonstrates understanding. [x] Needs review. [] Demonstrates/verbalizes HEP/Ed previously given. Plan: [x] Continue current frequency toward long and short term goals.     [x] Specific Instructions for subsequent treatments: add Prone Ys and Ts      Time In:1000        Time Out: 2717    Electronically signed by:  Mike Gomez, PT

## 2021-06-03 NOTE — FLOWSHEET NOTE
[x] [facility]       Occupational Therapy            1st floor       [address], [place]         Phone: [phone]       Fax: [phone] [] Alejandrina Catina Occupational Therapy  [address], [place]  Phone: [phone]  Fax: [phone]      Occupational Therapy Daily Treatment Note    Date:  6/3/2021  Patient Name:  Maria De Jesus Owens    :  1980  MRN: [id number]  Referring Provider:  Luis M Coffman MD  Insurance: Other [employer] [facility]     Medical Diagnosis: DeQuervains Tenosynovis R M65.4      Rehab Codes: pain in hand M79.646,, pain in wrist M25.53,, stiffness in hand M25.64,, stiffness in wrist M25.63,, muscle weakness generalized M62.81, or history of falls Z91.81,  Onset Date: 10-1-20                Next Dr. Kristi Francois: TBD  Visit# / total visits: ; Progress note for patient due at visit 6    Cancels/No Shows: 0/0      Subjective:    Pain:  [] Yes  [x] No Location: R wrist  Pain Rating: (0-10 scale) 0/10 at rest, pain increases with activity Pain reached 7/10 in Physical Therapy this date (putting weight on it)  Pain altered Tx:  [x] No  [] Yes  Action: ultrasound, ionto, massage   Pt Comments: Pt reports ionto is not working for him and wants to stop. Objective:  Modalities:  Modality Flow Sheet:  START STOP Tx Modality   2021 Discontinue 6/3/21 Iontophoresis for decrease of local tissue inflammation and damage. Waveform: DC, Dosage: 40-80 mA min, Intensity: Up to 4-5 mA, Duration: 10-20 mins depending on dosage and intensity. Allergies discussed with pt and pt identifier confirmed by statement of full name and birthdate.     Treatments administered: 2021  Ultrasound: _.8__ W/cm2 x __8_ mins  Duty factor: _X_100%  __50%  __33% __20%  Head size:    __2_ cm  MHz: __1mHz  _X_3mHz  Location: base of R thumb     Hot Pack:     Cold Pack:       Precautions: NA  Exercises:  EXERCISE    REPS/     TIME  WEIGHT/    LEVEL COMMENTS   Soft tissue massage  8 minutes   Not completed this date- base of R thumb   Theraputty   Strength  Finger flexion  Finger extension  Radial Dev  Ulnar Dev  Palmar Pinch  Lateral Pinch  Thumb ext  Three jaw char  Finger thumb ext   10 X soft (yellow)   Completed this date   Pinch pins with foam blocks 1 series Red 2 pounds Completed this date                             Other: Patient with no complaints of pain during session. Pt asking if stretching will be added to the therapy program. Patient issued yellow theraputty this date. Patient educated on putty precautions and to bring to therapy. Theraputty delivers a simple, effective resistance based training routine which helps to develop a strong, capable . Simply shaping and forming the non-flammable, non-toxic putty actively engages the hands in a safe, easy exercise that delivers encouraging results. Therapy Putty can be used to improve  strength, dexterity, and hand strengthening through finger, hand, and wrist resistive exercises. Begin program with the tan or yellow and gradually increase the resistance as hand gains strength. Treatment Charges: Mins Units   []  Modalities:  Iontophoresis           [x]  Ultrasound 8 1   [x]  Ther Exercise 25 1   []  Manual Therapy     []  Ther Activities     []  Orthotic fit/train     []  Orthotic recheck     []  Other     Total Treatment time 33          Assessment: [x] Progressing toward goals. Pt declined continued ionto treatment due to statement that, \"It doesn't help long term. \" Pt stated, \"I think I should be stretching to help and that it will work if I stretch until it hurts. \" Pt educated on stretching within limits of comfort and that patient demo joints are hypermobile. [] No change. [] Other     [x] Patient would continue to benefit from skilled occupational therapy services in order to:  Improve  functional /grasp, Strength and Complaint of pain in order to improve functional use of UE in ADL performance    Short Term Goals: (6  Treatments)  1. Decrease Pain: to 1/10 with pushing open doors (wrist extension)  2. Increase strength (pounds);  a. R  strength to 70 for increased I with packing for move in the fall  b. R lateral pinch to 23 for increased I with use of camera for work related purposes  c. R 2 point pinch to 15 for increased I with typing for computer tasks  d. R 3 jaw char for increased I with cooking tasks  3. Increase function:UE Functional Index Score 74 or more points to promote increased functional abilities  4. Demo knowledge of fall prevention in 3 sessions. 5. Patient to be independent with home exercise program as demonstrated by performance with correct form without cues.     Long Term Goals: (  12  Treatments)  1. Tolerate ionto pending doctor approval  2. ? R  strength to 75 for increased I with packing/moving     Patient Goals: ability to put weight on my palm without pain, the ability to carry things without pain as result. Pt. Education:  [x] Yes  [] No  [] Reviewed Prior HEP/Ed  Method of Education: [x] Verbal  [] Demo  [] Written  Re: benefits of ionto, ultrasound   Comprehension of Education:  [x] Verbalizes understanding. [] Demonstrates understanding. [] Needs review. [] Demonstrates/verbalizes HEP/Ed previously given. Plan: [x] Continue current frequency toward short and long term goals. [x] Specific Instructions for subsequent treatments: ultrasound, strengthening (when applicable), plan to issue types of stretches, how to stretch, and POC.    [] Other:       Time In: 1100  Time Out: 1488        Electronically signed by:  GIULIA Womack/TRISTON

## 2021-06-07 ENCOUNTER — HOSPITAL ENCOUNTER (OUTPATIENT)
Dept: PHYSICAL THERAPY | Age: 41
Setting detail: THERAPIES SERIES
Discharge: HOME | End: 2021-06-07
Payer: COMMERCIAL

## 2021-06-07 ENCOUNTER — HOSPITAL ENCOUNTER (OUTPATIENT)
Dept: OCCUPATIONAL THERAPY | Age: 41
Setting detail: THERAPIES SERIES
Discharge: HOME | End: 2021-06-07
Payer: COMMERCIAL

## 2021-06-07 ENCOUNTER — APPOINTMENT (OUTPATIENT)
Dept: PHYSICAL THERAPY | Age: 41
End: 2021-06-07
Payer: COMMERCIAL

## 2021-06-07 PROCEDURE — 97110 THERAPEUTIC EXERCISES: CPT

## 2021-06-07 PROCEDURE — 97033 APP MDLTY 1+IONTPHRSIS EA 15: CPT

## 2021-06-07 PROCEDURE — 97035 APP MDLTY 1+ULTRASOUND EA 15: CPT

## 2021-06-07 NOTE — FLOWSHEET NOTE
[x] [facility]       Occupational Therapy            1st floor       [address], [place]         Phone: [phone]       Fax: [phone] [] Alejandrina Catina Occupational Therapy  [address], [place]  Phone: [phone]  Fax: [phone]      Occupational Therapy Daily Treatment Note    Date:  2021  Patient Name:  Marisol Franklin    :  1980  MRN: [id number]  Referring Provider:  Keyur Chauhan MD  Insurance: Other Generic [employer] [facility]     Medical Diagnosis: DeQuervains Tenosynovis R M65.4      Rehab Codes: pain in hand M79.646,, pain in wrist M25.53,, stiffness in hand M25.64,, stiffness in wrist M25.63,, muscle weakness generalized M62.81, or history of falls Z91.81,  Onset Date: 10-1-20                Next Dr. Cunningham Quiver: TBD  Visit# / total visits: ; Progress note for patient due at visit 6    Cancels/No Shows: 0/0      Subjective:    Pain:  [x] Yes  [] No Location: R wrist  Pain Rating: (0-10 scale) 0/10 at rest, pain increases with activity   Pain altered Tx:  [x] No  [] Yes  Action: ultrasound, ionto, massage   Pt Comments: Pt reports pain has increased and had trouble brushing teeth. Objective:  Modalities:  Modality Flow Sheet:  START STOP Tx Modality   2021 Discontinue 6/3/21    Re-administered 21 Iontophoresis for decrease of local tissue inflammation and damage. Waveform: DC, Dosage: 40-80 mA min, Intensity: Up to 4-5 mA, Duration: 10-20 mins depending on dosage and intensity. Allergies discussed with pt and pt identifier confirmed by statement of full name and birthdate.     Treatments administered: 3/8     5-  Ultrasound: _.8__ W/cm2 x __8_ mins  Duty factor: _X_100%  __50%  __33% __20%  Head size:    __2_ cm  MHz: __1mHz  _X_3mHz  Location: base of R thumb     Hot Pack:     Cold Pack:       Precautions: NA  Exercises:  EXERCISE    REPS/     TIME  WEIGHT/    LEVEL COMMENTS   Soft tissue massage  8 minutes Not completed this date- base of R thumb   Theraputty   Strength  Finger flexion  Finger extension  Radial Dev  Ulnar Dev  Palmar Pinch  Lateral Pinch  Thumb ext  Three jaw char  Finger thumb ext   10 Pink (Light)   Upgraded to pink from yellow - Completed , finger flexion/extension, radial/ulnar deviation this date   Pinch pins with foam blocks 1 series Red 2 pounds Completed this date   Wrist Stretch  Flexion  Extension  Ulnar Deviation  Radial Deviation 5-10 seconds    10 reps  Added this date - completed                       Other: Patient with complaints of pain and fatigue during session. Pt issued pink theraputty this date. Pt reports pink putty slightly more difficult. Pt only completed , finger flexion/extension, radial/ulnar deviation this date due to pain. Pt provided handouts on stretching education and wrist stretching (Hand ROM exercises) to prevent potential damage with excessive force of stretching. . Pt re-educated on not over stretching due to hypermobile joints. Pt requested ionto treatment due to pain in base of R thumb, ionto provided with no noted skin integrity issues. Treatment Charges: Mins Units   [x]  Modalities:  Iontophoresis     20 1   [x]  Ultrasound 8 1   [x]  Ther Exercise 20 1   []  Manual Therapy     []  Ther Activities     []  Orthotic fit/train     []  Orthotic recheck     []  Other     Total Treatment time 48      Assessment: [x] Progressing toward goals. Pt re-educated on stretching within limits of comfort and that patient demo joints are hypermobile. [] No change. [] Other     [x] Patient would continue to benefit from skilled occupational therapy services in order to: Improve  functional /grasp, Strength and Complaint of pain in order to improve functional use of UE in ADL performance    Short Term Goals: (6  Treatments)  1. Decrease Pain: to 1/10 with pushing open doors (wrist extension)  2.  Increase strength (pounds);  a. R  strength to 70 for increased I with packing for move in the fall  b. R lateral pinch to 23 for increased I with use of camera for work related purposes  c. R 2 point pinch to 15 for increased I with typing for computer tasks  d. R 3 jaw char for increased I with cooking tasks  3. Increase function:UE Functional Index Score 74 or more points to promote increased functional abilities  4. Demo knowledge of fall prevention in 3 sessions. 5. Patient to be independent with home exercise program as demonstrated by performance with correct form without cues.     Long Term Goals: (  12  Treatments)  1. Tolerate ionto pending doctor approval  2. ? R  strength to 75 for increased I with packing/moving     Patient Goals: ability to put weight on my palm without pain, the ability to carry things without pain as result. Pt. Education:  [x] Yes  [] No  [] Reviewed Prior HEP/Ed  Method of Education: [x] Verbal  [] Demo  [] Written  Re: benefits of ionto, ultrasound   Comprehension of Education:  [x] Verbalizes understanding. [x] Demonstrates understanding. [] Needs review. [] Demonstrates/verbalizes HEP/Ed previously given. Plan: [x] Continue current frequency toward short and long term goals.   [x] Specific Instructions for subsequent treatments: ultrasound, strengthening (when applicable)   [] Other:       Time In: 5664  Time Out: 1110        Electronically signed by:  GIULIA Rust/TRISTON HARP-S

## 2021-06-07 NOTE — FLOWSHEET NOTE
[x] [facility]) [facility] CENTER &  Therapy  [address]  [phone]  F: [phone] [] [address]  [place] [address]   [address]  P: [phone]  F: [phone] [] [place]  [address]  P: [phone]  F: [phone] [] [email or web address]  P: [phone]  F: [phone] [] [address]  [facility]   [address]   [place]: [phone]  F: [phone]      Physical Therapy Daily Treatment Note    Date:  2021  Patient Name:  Zack Simms    :  1980  MRN: [id number]  Physician: Clara Ortega MD                Insurance: [facility] 30 visits  Medical Diagnosis: Acute low back pain without sciatica, unspecified back pain laterality (M54.5 [ICD-10-CM]); 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 Objective:  Modalities:   Precautions:  Exercises:  Exercise Reps/ Time Weight/ Level x Comments   Elliptical 5 min  L3 x    Standing    Wrist brace worn as needed.     Cybex Cables       Seated lat pull down  2x15x 5 pl x    Standing lat pull down  2x15x 2 pl  x    Standing row  2x15x  5 pl  x    Standing Single arm row 2x10x 3 pl x    Paloff Rotation [id number] 3 pl  x Used 45-45 rotation today   FW OH press  2x10x 10 lb      Standing lat stretch on black table  3x20\"  x           Power strides 2x15x ea 15 lb  x Hold weight on

## 2021-06-10 ENCOUNTER — HOSPITAL ENCOUNTER (OUTPATIENT)
Dept: PHYSICAL THERAPY | Age: 41
Setting detail: THERAPIES SERIES
Discharge: HOME | End: 2021-06-10
Payer: COMMERCIAL

## 2021-06-10 ENCOUNTER — APPOINTMENT (OUTPATIENT)
Dept: PHYSICAL THERAPY | Age: 41
End: 2021-06-10
Payer: COMMERCIAL

## 2021-06-10 ENCOUNTER — HOSPITAL ENCOUNTER (OUTPATIENT)
Dept: OCCUPATIONAL THERAPY | Age: 41
Setting detail: THERAPIES SERIES
Discharge: HOME | End: 2021-06-10
Payer: COMMERCIAL

## 2021-06-10 NOTE — SIGNIFICANT EVENT
[x] [address]  [address]    [phone]  F: [phone]     Occupational Therapy Cancel/No Show note    Date: 6/10/2021  Patient: Ricky Kim  : 1980  MRN: [id number]    Cancels/No Shows to date:     For today's appointment patient:    [x]  Cancelled    [] Rescheduled appointment    [] No-show     Reason given by patient:    []  Patient ill    []  Conflicting appointment    [] No transportation      [] Conflict with work    [] No reason given    [] Weather related    [] [id number]    [x] Other:      Comments:  Called 21 to cancel, no reason provided      [] Next appointment was confirmed    Electronically signed by: GIULIA Singh/TRISTON PASTRANAS

## 2021-06-15 ENCOUNTER — APPOINTMENT (OUTPATIENT)
Dept: OCCUPATIONAL THERAPY | Age: 41
End: 2021-06-15
Payer: COMMERCIAL

## 2021-06-15 ENCOUNTER — APPOINTMENT (OUTPATIENT)
Dept: PHYSICAL THERAPY | Age: 41
End: 2021-06-15
Payer: COMMERCIAL

## 2021-06-17 ENCOUNTER — HOSPITAL ENCOUNTER (OUTPATIENT)
Dept: PHYSICAL THERAPY | Age: 41
Setting detail: THERAPIES SERIES
Discharge: HOME | End: 2021-06-17
Payer: COMMERCIAL

## 2021-06-17 ENCOUNTER — APPOINTMENT (OUTPATIENT)
Dept: PHYSICAL THERAPY | Age: 41
End: 2021-06-17
Payer: COMMERCIAL

## 2021-06-17 PROCEDURE — 97110 THERAPEUTIC EXERCISES: CPT

## 2021-06-17 NOTE — FLOWSHEET NOTE
[x] [facility] CENTER &  Therapy  [address]  [phone]  F: [phone] [] [address]  [facility] [address]   [address]  P: [phone]  F: [phone] [] [address]  [address]  P: [phone]  F: [phone] [] [address]  P: [phone]  F: [phone] [] [address]  [facility]   [address]   [place]: [phone]  F: [phone]      Physical Therapy Daily Treatment Note    Date:  2021  Patient Name:  Iliana Pan    :  1980  MRN: [id number]  Physician: Hiram Landeros MD                Insurance: [facility] 30 visits  Medical Diagnosis: Acute low back pain without sciatica, unspecified back pain laterality (M54.5 [ICD-10-CM]); 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 Objective:   Modalities:   Precautions:  Exercises:  Exercise Reps/ Time Weight/ Level x Comments   Elliptical 5 min  L3  Patient defers    Standing    Wrist brace worn as needed.     Cybex Cables       Seated lat pull down  2x15x 5 pl x    Standing lat pull down  2x15x 2 pl      Standing row  2x15x  5 pl  x    Standing Single arm row 2x10x 3 pl x    Paloff Rotation [id number] 3 pl  x Used 45-45 rotation today   FW OH press  2x10x 10 lb  x    Standing lat stretch on black table  3x20\"  x           Power strides 2x15x ea 15 lb Hold weight on contralateral side of stepping leg   Goblet squats  2x15x ea 15 lb  x    2 way hip ext /abd  20x ea Blue  x    Reverse lunge  20x ea   Patient defers           Prone Row on TG  2x10x 10 lb     Ys, Ts, ext   3 lb            Mat        Bird dogs 10x      Cat/ camel  5x   difficult    SL bridges 10x ea             Corner Pec Stretch     Demonstrated for patient                  Other:      Treatment Charges: Mins Units   []  Modalities     [x]  Ther Exercise 55 4   []  Manual Therapy     []  Ther Activities     []  Aquatics     []  Vasocompression     []  Other     Total Treatment time 55 4       Assessment: [x] Progressing toward goals. Fair tolerance to all exercises. Patient did not want to do lunges this date since he pulled a muscle. Patient having decreased scapular stabilization with prone Y's and T's, fatigues quickly. Educated on foam rolling the LE, as patient wants to start this at home. [] No change. [x] Other:   [x] Patient would continue to benefit from skilled physical therapy services in order to: improve core strength and reduce back pain. STG/LTG  STG: (to be met in 8 treatments)   1. ? Pain: 3/10 low back pain with standing activity. 2. ? Strength: 4+/5 left scapular strength to improve coordination and reduce winging. 3. ? Function: Oswestry score of 24% impaired or better to improve ADLs. 4. Independent with Home Exercise Programs     LTG: (to be met in 12 treatments)  1. Patient reports increased standing tolerance due to improved back pain. 2. Patient is able to hold a low plank for 1 min. Pt. Education:  [x] Yes  [] No  [x] Reviewed Prior HEP/Ed  Educated on self-foam roll at home. Method of Education: [x] Verbal  [] Demo  [] Written   Comprehension of Education:  [x] Verbalizes understanding. [x] Demonstrates understanding. [x] Needs review. [] Demonstrates/verbalizes HEP/Ed previously given.      Plan: [x] Continue current frequency toward long and short term goals.     [x] Specific Instructions for subsequent treatments: progress scapular stabilization, serratus punches       Time In:17:00        Time Out: 1800    Electronically signed by:  Hernesto Ricketts PT

## 2021-06-21 ENCOUNTER — APPOINTMENT (OUTPATIENT)
Dept: OCCUPATIONAL THERAPY | Age: 41
End: 2021-06-21
Payer: COMMERCIAL

## 2021-06-24 ENCOUNTER — APPOINTMENT (OUTPATIENT)
Dept: PHYSICAL THERAPY | Age: 41
End: 2021-06-24
Payer: COMMERCIAL

## 2021-06-24 ENCOUNTER — APPOINTMENT (OUTPATIENT)
Dept: OCCUPATIONAL THERAPY | Age: 41
End: 2021-06-24
Payer: COMMERCIAL

## 2021-06-28 ENCOUNTER — HOSPITAL ENCOUNTER (OUTPATIENT)
Dept: PHYSICAL THERAPY | Age: 41
Setting detail: THERAPIES SERIES
Discharge: HOME | End: 2021-06-28
Payer: COMMERCIAL

## 2021-06-28 ENCOUNTER — HOSPITAL ENCOUNTER (OUTPATIENT)
Dept: OCCUPATIONAL THERAPY | Age: 41
Setting detail: THERAPIES SERIES
Discharge: HOME | End: 2021-06-28
Payer: COMMERCIAL

## 2021-06-28 PROCEDURE — 97110 THERAPEUTIC EXERCISES: CPT

## 2021-06-28 PROCEDURE — 97035 APP MDLTY 1+ULTRASOUND EA 15: CPT

## 2021-06-28 NOTE — PROGRESS NOTES
[x] [facility] CENTER &  Therapy  [address]  [phone]  F: [phone] [] [address]  [address]   [address]  P: [phone]  F: [phone] [] [address]  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 Objective:   Modalities:   Precautions:  Exercises:  Exercise Reps/ Time Weight/ Level x Comments   Elliptical 5 min  L3 x Patient defers    Standing    Wrist brace worn as needed.     Cybex Cables       Seated lat pull down  2x10x 6 pl x    Standing lat pull down  2x10x 3 pl  x    Standing row  2x10x  6 pl  x    Standing Single arm row 2x10x 3 pl x    Paloff Rotation [id number] 3 pl  x Used 45-45 rotation today   FW OH press  2x10x 10 lb  x    Standing lat stretch on black table  3x20\"  x           Power strides 2x15x ea 15 lb  x Hold weight on contralateral side of stepping leg   Goblet squats  2x15x ea 15 lb  x L knee popping    2 way hip ext /abd  20x ea Blue  x    Reverse lunge  20x ea  x           Prone Row on TG  2x10x 10 lb     Ys, Ts, ext   3 lb            Mat        Bird dogs 10x      Cat/ camel  5x   difficult    SL bridges 10x ea             Corner Pec Stretch     Demonstrated for patient                  Other:      Treatment Charges: Mins Units   []  Modalities     [x]  Ther Exercise 60 4   []  Manual Therapy     []  Ther Activities     []  Aquatics     []  Vasocompression     []  Other     Total Treatment time 60 4       Assessment: [x] Progressing toward goals. Patient is progressing well with PT. Scapular strength and lumbar pain have improved. He currently rates his low back impairment at 24%. Will continue PT per current POC. [] No change. [x] Other:   [x] Patient would continue to benefit from skilled physical therapy services in order to: improve core strength and reduce back pain. STG/LTG  STG: (to be met in 8 treatments)   1. ? Pain: 3/10 low back pain with standing activity. Met  2. ? Strength: 4+/5 left scapular strength to improve coordination and reduce winging. Met  3. ? Function: Oswestry score of 24% impaired or better to improve ADLs. Met  4. Independent with Home Exercise Programs Met     LTG: (to be met in 12 treatments)  1. Patient reports increased standing tolerance due to improved back pain. 2. Patient is able to hold a low plank for 1 min. Pt. Education:  [x] Yes  [] No  [x] Reviewed Prior HEP/Ed  Educated on self-foam roll at home. Method of Education: [x] Verbal  [] Demo  [] Written   Comprehension of Education:  [x] Verbalizes understanding. [x] Demonstrates understanding. [x] Needs review. [] Demonstrates/verbalizes HEP/Ed previously given. Plan: [x] Continue current frequency toward long and short term goals.     [x] Specific Instructions for subsequent treatments: progress scapular stabilization, serratus punches       Time In:0750        Time Out: 1870    Electronically signed by:  Blayne Galloway PT

## 2021-06-28 NOTE — PROGRESS NOTES
[x] [facility] &  Therapy  [address]  [phone]  F: [phone] [] [facility] Occupational Therapy  [address] [place] [address]  Phone: [phone]  Fax: [phone]        Occupational Therapy Progress Note    Date: 2021      Patient: Tegan Drilling  : 1980  MRN: [id number]    Referring Provider:  José Miguel Pollock MD  Insurance: Other Generic Auto Insurance [facility]     Medical Diagnosis: DeQuervains Tenosynovis R M65.4      Rehab Codes: pain in hand M79.646,, pain in wrist M25.53,, stiffness in hand M25.64,, stiffness in wrist M25.63,, muscle weakness generalized M62.81, or history of falls Z91.81,  Onset Date: 10-1-20                Next Dr. Cody Swanson: TBD  Total visits attended:   Cancels/No shows:   Date range of services: 21 to 21    Subjective:  Pain:  [] Yes  [x] No  Location: N/A Pain Rating: (0-10 scale) 0/10 Pain altered Tx:  [x] No  [] Yes  Action:  Comments: \"I don't have the random pain anymore. I haven't used my hand in ways that would hurt it. \"    Objective:  Objective: Tests/Measurements: Upper Extremity Functional Index  Current Functional Level:  66/80 (same) functionally impaired as measured with the Upper Extremity Functional Index Survey. 0-80 scale, with 80 = no Deficits(The UEFI model does not provide any specific cut off points that could classify the upper limb disability degree, however, a minimal detectable change of 9 points is provided. This means that for improvement or deterioration to be considered, between two subsequent evaluations, the scores must differ by at least 9 points.)     Sensibility: Normal    Edema: None      Skin Color: Normal   Skin/Scar condition Intact     Problems:      Pain, ROM, Strength and Falls: History or Risk of                STRENGTH (Measured in pounds)                pounds RIGHT LEFT    73 inc 8 62   Lateral pinch 23 inc 2.5 20   2 point pinch 14 inc 1 14   3 jaw pinch 17 inc 1 14      The affected extremity is 0 % weaker than the unaffected extremity. (affected score/unaffected score, take the total and subtract from 100)      Assessment: Pt has a  Strength of 73 pounds (inc 8) on the R hand. Pt currently has a lateral pinch of 23 pounds (inc 2.5) on the R, two point pinch of 14 pounds (inc 1) on the R side and a three jaw char pinch of 17 pounds (inc 1) on the R side. Pt has a UEFI score of 66/80 this date indicating a MOD level of function at this time. Pt UEFI remains the same due to pt refused to update UEFI stating, \"it doesn't matter. The scores are arbitrary\". Plan to discuss with patient potential discharge from OT services with HEP due to increased progress meeting goals. Short Term Goals: (6  Treatments)  Decrease Pain: to 1/10 with pushing open doors (wrist extension) MET 0/10  Increase strength (pounds);  R  strength to 70 for increased I with packing for move in the fall MET 73 pounds  R lateral pinch to 23 for increased I with use of camera for work related purposes MET 23 pounds  R 2 point pinch to 15 for increased I with typing for computer tasks ONGOING   R 3 jaw char for increased I with cooking tasks MET  Increase function:UE Functional Index Score 74 or more points to promote increased functional abilities NOT MET  Demo knowledge of fall prevention in 3 sessions. MET  Patient to be independent with home exercise program as demonstrated by performance with correct form without cues. MET     Long Term Goals: (  12  Treatments)  Tolerate ionto pending doctor approval MET  ? R  strength to 75 for increased I with packing/moving ONGOING      Treatment Plan:   Therapeutic Ex 31442, HEP and Ultrasound [id number]    Patient Status: (requested frequency/duration)     [x] Continue per initial/current plan of care 2 times per week for 6 remaining visits. [] Additional visits necessary.     Electronically signed by Jaye SHIELDS on 6/28/2021 at 7:07 AM      If you have any questions or concerns, please don't hesitate to call. Thank you for your referral.    Physician Signature:________________________________Date:__________________  By signing above or cosigning this note, I have reviewed this plan of care and certify a need for medically necessary rehabilitation services.      *PLEASE SIGN ABOVE AND FAX BACK ALL PAGES

## 2021-06-28 NOTE — FLOWSHEET NOTE
[x] [facility]       Occupational Therapy            1st floor       [address], [place]         Phone: [phone]       Fax: [phone] [] Alej[facility]rina Catina Occupational Therapy  [address], [place]  Phone: [phone]  Fax: [phone]      Occupational Therapy Daily Treatment Note    Date:  2021  Patient Name:  Mariposa Betancourt    :  1980  MRN: [id number]  Referring Provider:  Alton Gonzalez MD  Insurance: Other Generic Auto Insurance [facility]     Medical Diagnosis: DeQuervains Tenosynovis R M65.4      Rehab Codes: pain in hand M79.646,, pain in wrist M25.53,, stiffness in hand M25.64,, stiffness in wrist M25.63,, muscle weakness generalized M62.81, or history of falls Z91.81,  Onset Date: 10-1-20                Next Dr. Chamberlain [place]: D  Visit# / total visits: ; Progress note for patient due at visit 6    Cancels/No Shows: 0/0    Subjective:    Pain:  [] Yes  [x] No Location: R wrist  Pain Rating: (0-10 scale) 0/10 at rest  Pain altered Tx:  [x] No  [] Yes  Action: ultrasound, ionto, massage   Pt Comments: Pt reports feeling well and doing a pull-up with no pain. Objective:  Modalities:  Modality Flow Sheet:  START STOP Tx Modality   2021 Discontinue 6/3/21    Re-administered 21 Iontophoresis for decrease of local tissue inflammation and damage. Waveform: DC, Dosage: 40-80 mA min, Intensity: Up to 4-5 mA, Duration: 10-20 mins depending on dosage and intensity. Allergies discussed with pt and pt identifier confirmed by statement of full name and birthdate.     Treatments administered: 3/8     5-  Ultrasound: _.8__ W/cm2 x __8_ mins  Duty factor: _X_100%  __50%  __33% __20%  Head size:    __2_ cm  MHz: __1mHz  _X_3mHz  Location: base of R thumb     Hot Pack:     Cold Pack:       Precautions: NA  Exercises:  EXERCISE    REPS/     TIME  WEIGHT/    LEVEL COMMENTS   Soft tissue massage  8 minutes   Not completed this date- base of R thumb   Theraputty   Strength  Finger flexion  Finger extension  Radial Dev  Ulnar Dev  Palmar Pinch  Lateral Pinch  Thumb ext  Three jaw char  Finger thumb ext   10 Pink (Light)   Not completed   Pinch pins with foam blocks 1 series Red 2 pounds Completed this date    Wrist Stretch  Flexion  Extension  Ulnar Deviation  Radial Deviation 5-10 seconds    10 reps  Not completed   Flexbar   strength  Wrist Flexion/Ext  Wrist pronation  Wrist supination  Wrist ulnar deviation  Wrist radial dev 10 reps Red (10 pounds) Added this date - completed   Dexaroll   Added this date - completed   Pronation/Supination Bar 10 reps 1/2 pound Added this date - completed   Hand Exerciser 10 reps 25 pounds Added this date - completed   Wrist Maze 2 series  Added this date - completed           Other: Pt reports using stress ball with different  for strengthening. Pt reports stretches \"going well\". Pt reports not wanting ionto due to no pain and \"feels like it only worked the first time because I had ultrasound after. \" Pt reports not using putty due to \"getting exercises done with stretching and stress ball\". Initiated flexbar strengthening. The Flexbar has been designed specifically for improving  strength and upper extremity function. It also is vital for other applications such as oscillation and mobilization. Its specific applications Strength (improve  strength and UE strength), Oscillation (allows osillation movements for neuromuscular training and balance training) and Mobilization (provides soft tissue mobilization and joint mobilization) help to enhance the recovery process with different levels of resistance starting at 6 pounds to 25 pounds. Administered ultrasound to base of R thumb.     Treatment Charges: Mins Units   []  Modalities:  Iontophoresis         [x]  Ultrasound 8 1   [x]  Ther Exercise 40 2   []  Manual Therapy     []  Ther Activities     []  Orthotic fit/train     []  Orthotic recheck     [] Other     Total Treatment time 48      Assessment: [x] Progressing toward goals. Pt re-educated on stretching within limits of comfort and that patient demo joints are hypermobile. [] No change. [] Other     [x] Patient would continue to benefit from skilled occupational therapy services in order to: Improve  functional /grasp, Strength and Complaint of pain in order to improve functional use of UE in ADL performance    Short Term Goals: (6  Treatments)  Decrease Pain: to 1/10 with pushing open doors (wrist extension) MET 0/10  Increase strength (pounds);  R  strength to 70 for increased I with packing for move in the fall MET 73 pounds  R lateral pinch to 23 for increased I with use of camera for work related purposes MET 23 pounds  R 2 point pinch to 15 for increased I with typing for computer tasks ONGOING   R 3 jaw char for increased I with cooking tasks MET  Increase function:UE Functional Index Score 74 or more points to promote increased functional abilities NOT MET  Demo knowledge of fall prevention in 3 sessions. MET  Patient to be independent with home exercise program as demonstrated by performance with correct form without cues. MET     Long Term Goals: (  12  Treatments)  Tolerate ionto pending doctor approval MET  ? R  strength to 75 for increased I with packing/moving ONGOING     Patient Goals: ability to put weight on my palm without pain, the ability to carry things without pain as result. Pt. Education:  [x] Yes  [] No  [x] Reviewed Prior HEP/Ed  Method of Education: [x] Verbal  [] Demo  [] Written  Re: benefits of ionto, ultrasound   Comprehension of Education:  [x] Verbalizes understanding. [x] Demonstrates understanding. [] Needs review. [] Demonstrates/verbalizes HEP/Ed previously given. Plan: [x] Continue current frequency toward short and long term goals.   [x] Specific Instructions for subsequent treatments: ultrasound, strengthening (when applicable), plan to discuss potential discharge from OT services with HEP due to increased progress meeting goals   [] Other:       Time In: 0700  Time Out: [address]        Electronically signed by:  Kj SHIELDS

## 2021-07-01 ENCOUNTER — HOSPITAL ENCOUNTER (OUTPATIENT)
Dept: PHYSICAL THERAPY | Age: 41
Setting detail: THERAPIES SERIES
Discharge: HOME | End: 2021-07-01
Payer: COMMERCIAL

## 2021-07-01 ENCOUNTER — HOSPITAL ENCOUNTER (OUTPATIENT)
Dept: OCCUPATIONAL THERAPY | Age: 41
Setting detail: THERAPIES SERIES
Discharge: HOME | End: 2021-07-01
Payer: COMMERCIAL

## 2021-07-01 PROCEDURE — 97110 THERAPEUTIC EXERCISES: CPT

## 2021-07-01 PROCEDURE — 97035 APP MDLTY 1+ULTRASOUND EA 15: CPT

## 2021-07-01 NOTE — PROGRESS NOTES
[x] [address]  - [facility] TWELVE-STEP [facility] CENTER &  Therapy  [address]  [phone]  F: [phone] [] [address]  [place] [address]   [address]  P: [phone]  F: [phone] [] [place]  [address]  P: [phone]  F: [phone] [] [address]  P: [phone]  F: [phone] [] [address]  [facility]   [address]   [place]: [phone]  F: [phone]      Physical Therapy Daily Treatment Note/ Progress Note    Date:  2021  Patient Name:  Sim Lara    :  1980  MRN: [id number]  Physician: Noemy Card MD                Insurance: [facility] 30 visits   Medical Diagnosis: Acute low back pain without sciatica, unspecified back pain laterality (M54.5 [ICD-10-CM]); 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 Objective:   Modalities:   Precautions:  Exercises:   Exercise Reps/ Time Weight/ Level x Comments   Elliptical 5 min  L3 x Patient defers    Standing    Wrist brace worn as needed.     Cybex Cables       Seated lat pull down  2x10x 6 pl x    Standing lat pull down  2x10x 3 pl  x    Standing row  2x10x  6 pl  x    Standing Single arm row 2x10x 3 pl x    Paloff Rotation [id number] 3 pl  x Used 45-45 rotation today   FW OH press  2x10x 15 lb  x    Standing lat stretch on black table  3x20\"  x           Power strides 2x15x ea 15 lb  x Hold weight on contralateral side of stepping leg   Goblet squats  2x15x ea 15 lb  x L knee popping    2 way hip ext /abd  20x ea Blue  x    Reverse lunge  20x ea  x           Prone Row on TG  2x10x 10 lb     Ys, Ts, ext   3 lb            Mat        Bird dogs 10x      Cat/ camel  5x   difficult    SL bridges 10x ea             Corner Pec Stretch     Demonstrated for patient                  Other:      Treatment Charges: Mins Units   []  Modalities     [x]  Ther Exercise 45 3   []  Manual Therapy     []  Ther Activities     []  Aquatics     []  Vasocompression     []  Other     Total Treatment time 45 3       Assessment: [x] Progressing toward goals. Patient demonstrated good tolerance to all exercise this date. Progressed OH press to 15 l weights. [] No change. [x] Other:   [x] Patient would continue to benefit from skilled physical therapy services in order to: improve core strength and reduce back pain. STG/LTG  STG: (to be met in 8 treatments)   1. ? Pain: 3/10 low back pain with standing activity. Met  2. ? Strength: 4+/5 left scapular strength to improve coordination and reduce winging. Met  3. ? Function: Oswestry score of 24% impaired or better to improve ADLs. Met  4. Independent with Home Exercise Programs Met     LTG: (to be met in 12 treatments)  1. Patient reports increased standing tolerance due to improved back pain. 2. Patient is able to hold a low plank for 1 min. Pt. Education:  [x] Yes  [] No  [x] Reviewed Prior HEP/Ed  Educated on self-foam roll at home. Method of Education: [x] Verbal  [] Demo  [] Written   Comprehension of Education:  [x] Verbalizes understanding. [x] Demonstrates understanding. [x] Needs review. [] Demonstrates/verbalizes HEP/Ed previously given. Plan: [x] Continue current frequency toward long and short term goals.     [x] Specific Instructions for subsequent treatments: progress scapular stabilization, serratus punches       Time [id number]        Time Out: 1020    Electronically signed by:  Jose Gaytan, PT

## 2021-07-01 NOTE — FLOWSHEET NOTE
[x] [facility]       Occupational Therapy            1st floor       [address], [place]         Phone: [phone]       Fax: [phone] [] Alejandrina Dominique Occupational Therapy  [address], [place]  Phone: [phone]  Fax: [phone]      Occupational Therapy Daily Treatment Note    Date:  2021  Patient Name:  Janette Ford    :  1980  MRN: [id number]  Referring Provider:  Migel Tejeda MD  Insurance: Other Generic Auto Insurance [facility]     Medical Diagnosis: DeQuervains Tenosynovis R M65.4      Rehab Codes: pain in hand M79.646,, pain in wrist M25.53,, stiffness in hand M25.64,, stiffness in wrist M25.63,, muscle weakness generalized M62.81, or history of falls Z91.81,  Onset Date: 10-1-20                Next Dr. Jennifer Gonzalezivener: TBD  Visit# / total visits: ; Progress note for patient due at visit 6    Cancels/No Shows: 0/0    Subjective:    Pain:  [] Yes  [x] No Location: R wrist  Pain Rating: (0-10 scale) 0/10 at rest  Pain altered Tx:  [x] No  [] Yes  Action: ultrasound, ionto, massage   Pt Comments: Pt reports that 99.9 % of time the pain is \"fine\". Pt reports that putting weight on it makes it hurt. Objective:  Modalities:  Modality Flow Sheet:  START STOP Tx Modality   2021 Discontinue 6/3/21    Re-administered 21 Iontophoresis for decrease of local tissue inflammation and damage. Waveform: DC, Dosage: 40-80 mA min, Intensity: Up to 4-5 mA, Duration: 10-20 mins depending on dosage and intensity. Allergies discussed with pt and pt identifier confirmed by statement of full name and birthdate.     Treatments administered: 3/8     5-  Ultrasound: _.8__ W/cm2 x __8_ mins  Duty factor: _X_100%  __50%  __33% __20%  Head size:    __2_ cm  MHz: __1mHz  _X_3mHz  Location: base of R thumb     Hot Pack:     Cold Pack:       Precautions: NA  Exercises:  EXERCISE    REPS/     TIME  WEIGHT/    LEVEL COMMENTS   Soft tissue massage  8 minutes   Not completed this date- base of R thumb   Theraputty   Strength  Finger flexion  Finger extension  Radial Dev  Ulnar Dev  Palmar Pinch  Lateral Pinch  Thumb ext  Three jaw char  Finger thumb ext   10 Pink (Light) completed   Pinch pins with foam blocks 1 series Red 2 pounds NOT Completed this date    Wrist Stretch  Flexion  Extension  Ulnar Deviation  Radial Deviation 5-10 seconds    10 reps  Not completed   Flexbar   strength  Wrist Flexion/Ext  Wrist pronation  Wrist supination  Wrist ulnar deviation  Wrist radial dev 10 reps Green (15 pounds) Increased this date,  completed   Dexaroll    completed   Pronation/Supination Bar 3 mins reps  1 pound  completed   Hand Exerciser 10 reps 25 pounds completed   Wrist Maze 2 series  completed           Other: Pt reports still using stress ball with different  for strengthening. Pt reports continues to do the stretches \"those are the two things I usually do\". Completed flexbar strengthening. Pt not wanting to do pinch pins this date as pt reports \"those do not help me with anything\". Administered ultrasound to base of R thumb. Treatment Charges: Mins Units   []  Modalities:  Iontophoresis         [x]  Ultrasound 8 1   [x]  Ther Exercise 50 3   []  Manual Therapy     []  Ther Activities     []  Orthotic fit/train     []  Orthotic recheck     []  Other     Total Treatment time 88      Assessment: [] Progressing toward goals. Pt re-educated on stretching within limits of comfort and that patient demo joints are hypermobile. [x] No change. [] Other     [x] Patient to continue  occupational therapy services in order to: Improve  functional /grasp, Strength and Complaint of pain in order to improve functional use of UE in ADL performance    Short Term Goals: (6  Treatments)  1. Decrease Pain: to 1/10 with pushing open doors (wrist extension) MET 0/10  2.  Increase strength (pounds);  a. R  strength to 70 for increased I with packing for

## 2021-07-06 ENCOUNTER — APPOINTMENT (OUTPATIENT)
Dept: PHYSICAL THERAPY | Age: 41
End: 2021-07-06
Payer: COMMERCIAL

## 2021-07-06 ENCOUNTER — APPOINTMENT (OUTPATIENT)
Dept: OCCUPATIONAL THERAPY | Age: 41
End: 2021-07-06
Payer: COMMERCIAL

## 2021-07-07 ENCOUNTER — OFFICE VISIT (OUTPATIENT)
Dept: FAMILY MEDICINE CLINIC | Age: 41
End: 2021-07-07
Payer: COMMERCIAL

## 2021-07-07 VITALS
BODY MASS INDEX: 22.65 KG/M2 | WEIGHT: 158.2 LBS | DIASTOLIC BLOOD PRESSURE: 66 MMHG | SYSTOLIC BLOOD PRESSURE: 116 MMHG | HEART RATE: 70 BPM | HEIGHT: 70 IN

## 2021-07-07 DIAGNOSIS — R10.84 GENERALIZED ABDOMINAL PAIN: Primary | ICD-10-CM

## 2021-07-07 DIAGNOSIS — K59.00 CONSTIPATION, UNSPECIFIED CONSTIPATION TYPE: ICD-10-CM

## 2021-07-07 PROCEDURE — 1036F TOBACCO NON-USER: CPT | Performed by: STUDENT IN AN ORGANIZED HEALTH CARE EDUCATION/TRAINING PROGRAM

## 2021-07-07 PROCEDURE — 99213 OFFICE O/P EST LOW 20 MIN: CPT | Performed by: STUDENT IN AN ORGANIZED HEALTH CARE EDUCATION/TRAINING PROGRAM

## 2021-07-07 PROCEDURE — G8427 DOCREV CUR MEDS BY ELIG CLIN: HCPCS | Performed by: STUDENT IN AN ORGANIZED HEALTH CARE EDUCATION/TRAINING PROGRAM

## 2021-07-07 PROCEDURE — G8420 CALC BMI NORM PARAMETERS: HCPCS | Performed by: STUDENT IN AN ORGANIZED HEALTH CARE EDUCATION/TRAINING PROGRAM

## 2021-07-07 RX ORDER — POLYETHYLENE GLYCOL 3350 17 G/17G
17 POWDER, FOR SOLUTION ORAL DAILY PRN
Qty: 1530 G | Refills: 1 | Status: SHIPPED | OUTPATIENT
Start: 2021-07-07 | End: 2021-08-06

## 2021-07-07 RX ORDER — AZELASTINE 1 MG/ML
SPRAY, METERED NASAL
COMMUNITY
Start: 2021-06-30

## 2021-07-07 RX ORDER — FLUTICASONE PROPIONATE 50 MCG
SPRAY, SUSPENSION (ML) NASAL
COMMUNITY
Start: 2021-07-01

## 2021-07-07 ASSESSMENT — ENCOUNTER SYMPTOMS
VOMITING: 0
SHORTNESS OF BREATH: 0
COLOR CHANGE: 0
ABDOMINAL PAIN: 1
COUGH: 0
BACK PAIN: 0
NAUSEA: 0
CHEST TIGHTNESS: 0
CONSTIPATION: 1
DIARRHEA: 0

## 2021-07-07 NOTE — PATIENT INSTRUCTIONS
Thank you for letting us take care of you today. We hope all your questions were addressed. If a question was overlooked or something else comes to mind after you return home, please contact a member of your Care Team listed below. Your Care Team at [place] [address] is Team #1  Bc Nguyễn MD (Faculty)  Marivel Montgomery (Resident)  Roberto Hall MD (Resident)  Keith Preston, NENITA Corrigan [facility] office)  Sapna Castaneda, [address] [place] [address] (Clinical Practice Manager)  Jef Hunt [facility] (Clinical Pharmacist)     Office phone number: [phone]    If you need to get in right away due to illness, please be advised we have \"Same Day\" appointments available Monday-Friday. Please call us at [phone] option #3 to schedule your \"Same Day\" appointment.

## 2021-07-07 NOTE — PROGRESS NOTES
Visit Information    Have you changed or started any medications since your last visit including any over-the-counter medicines, vitamins, or herbal medicines? no   Have you stopped taking any of your medications? Is so, why? -  yes - see list  Are you having any side effects from any of your medications? - no    Have you seen any other physician or provider since your last visit? yes - PT   Have you had any other diagnostic tests since your last visit?  no   Have you been seen in the emergency room and/or had an admission in a hospital since we last saw you?  no   Have you had your routine dental cleaning in the past 6 months?  no     Do you have an active MyChart account? If no, what is the barrier?   yes    Patient Care Team:  Azalia Guerrero MD as PCP - General (Family Medicine)  Gaby Segal MD as PCP - [facility][1]    Medical History Review  Past Medical, Family, and Social History reviewed and does not contribute to the patient presenting condition    Health Maintenance   Topic Date Due    Hepatitis C screen  Never done    Varicella vaccine (1 of 2 - 2-dose childhood series) Never done    DTaP/Tdap/Td vaccine (1 - Tdap) Never done    Flu vaccine (1) 09/01/2021    Lipid screen  11/19/2025    COVID-19 Vaccine  Completed    HIV screen  Completed    Hepatitis A vaccine  Aged Out    Hepatitis B vaccine  Aged Out    Hib vaccine  Aged Out    Meningococcal (ACWY) vaccine  Aged Out    Pneumococcal 0-64 years Vaccine  Aged Out

## 2021-07-07 NOTE — PROGRESS NOTES
Subjective: Tim Pressley is a 37 y.o. male with  has a past medical history of Asthma. Presented to the office today for:  Chief Complaint   Patient presents with    Abdominal Pain     come and goes last about 4 hours     Health Maintenance     declined tdap and varicella vaccine        HPI  37 y.o. male with  has a past medical history of Asthma presents complaining of abdominal pain. He reports that he had LLQ abdominal pain on May 17. on May18th diarrhea followed by abd pain--> 4 days,jasper after eating. Relieved with sitting, increased with reclining.  july 1st he developed Sharp, RLQ pain after eating, pain, gradualy worsening, gasex didn't help, 7/10. Heating pad didn't, ice pack helped slightly. July 5th, all over abdomen, 4 hours duration--> 10/10 so he tried an enema--> BM without improvement in pain. Percocet didn't help, having BM does not affect the pain. Not related to stress  He denies nausea, vomiting, change in bowel consistency, mucus or blood in stool, change un appettite, or change in weight. Review of Systems   Constitutional: Negative for activity change, appetite change, chills, fatigue and fever. HENT: Negative. Respiratory: Negative for cough, chest tightness and shortness of breath. Cardiovascular: Negative for chest pain, palpitations and leg swelling. Gastrointestinal: Positive for abdominal pain and constipation. Negative for diarrhea, nausea and vomiting. Genitourinary: Negative for decreased urine volume, difficulty urinating, dysuria, frequency and hematuria. Musculoskeletal: Negative for arthralgias, back pain and neck pain. Skin: Negative for color change. Neurological: Negative for dizziness, weakness, light-headedness, numbness and headaches. The patient has a No family history on file. Patient is adopted and does not know his family hx.     Objective:    /66 (Site: Left Upper Arm, Position: Sitting, Cuff Size: Medium Adult) Comment: machine  Pulse 70   Ht 5' 10\" (1.778 m)   Wt 158 lb 3.2 oz (71.8 kg)   BMI 22.70 kg/m²    BP Readings from Last 3 Encounters:   07/07/21 116/66   04/21/21 111/68   04/05/21 122/74       Physical Exam  Vitals and nursing note reviewed. Constitutional:       General: He is not in acute distress. Appearance: Normal appearance. He is not ill-appearing. HENT:      Head: Normocephalic and atraumatic. Mouth/Throat:      Pharynx: Oropharynx is clear. Cardiovascular:      Rate and Rhythm: Normal rate and regular rhythm. Pulses: Normal pulses. Heart sounds: No murmur heard. Pulmonary:      Effort: Pulmonary effort is normal.      Breath sounds: Normal breath sounds. Abdominal:      Palpations: Abdomen is soft. There is no mass. Tenderness: There is no abdominal tenderness. Musculoskeletal:         General: No swelling or tenderness. Normal range of motion. Cervical back: Normal range of motion and neck supple. Neurological:      General: No focal deficit present. Mental Status: He is alert and oriented to person, place, and time. Lab Results   Component Value Date    WBC 5.7 11/19/2020    HGB 15.1 11/19/2020    HCT 46.3 11/19/2020     11/19/2020    CHOL 170 11/19/2020    TRIG 67 11/19/2020    HDL 58 11/19/2020    ALT 13 11/19/2020    AST 13 11/19/2020     11/19/2020    K 4.1 11/19/2020     11/19/2020    CREATININE 0.74 11/19/2020    BUN 25 (H) 11/19/2020    CO2 28 11/19/2020     Lab Results   Component Value Date    CALCIUM 9.7 11/19/2020     Lab Results   Component Value Date    LDLCHOLESTEROL 99 11/19/2020       Assessment and Plan:    1. Generalized abdominal pain  Discussed with the patient that ddx. Advised patient to keep a diary of what he eats and his pain and BM patterns and to follow up in 2 weeks    2. Constipation, unspecified constipation type    - polyethylene glycol (GLYCOLAX) 17 GM/SCOOP powder;  Take 17 g by mouth daily as needed (constipation)  Dispense: 1530 g; Refill: 1      Requested Prescriptions     Signed Prescriptions Disp Refills    polyethylene glycol (GLYCOLAX) 17 GM/SCOOP powder 1530 g 1     Sig: Take 17 g by mouth daily as needed (constipation)       There are no discontinued medications. Cookie Dates received counseling on the following healthy behaviors: nutrition, exercise and medication adherence    Discussed use,benefit, and side effects of prescribed medications. Barriers to medication compliance addressed. All patient questions answered. Pt voiced understanding. Return in about 2 weeks (around 7/21/2021) for abdominal pain. Disclaimer: Some orall of this note was transcribed using voice-recognition software. This may cause typographical errors occasionally. Although all effort is made to fix these errors, please do not hesitate to contact our office if there Sadaf Millard concern with the understanding of this note.

## 2021-07-12 ENCOUNTER — HOSPITAL ENCOUNTER (OUTPATIENT)
Dept: PHYSICAL THERAPY | Age: 41
Setting detail: THERAPIES SERIES
Discharge: HOME | End: 2021-07-12
Payer: COMMERCIAL

## 2021-07-12 ENCOUNTER — HOSPITAL ENCOUNTER (OUTPATIENT)
Dept: OCCUPATIONAL THERAPY | Age: 41
Setting detail: THERAPIES SERIES
Discharge: HOME | End: 2021-07-12
Payer: COMMERCIAL

## 2021-07-12 PROCEDURE — 97110 THERAPEUTIC EXERCISES: CPT

## 2021-07-12 PROCEDURE — 97035 APP MDLTY 1+ULTRASOUND EA 15: CPT

## 2021-07-12 NOTE — FLOWSHEET NOTE
[x] [facility]       Occupational Therapy            1st floor       [address], [place]         Phone: [phone]       Fax: [phone] [] Alejand[facility] Dominique Occupational Therapy  [address], [place]  Phone: [phone]  Fax: [phone]      Occupational Therapy Daily Treatment Note    Date:  2021  Patient Name:  Dominic Epstein    :  1980  MRN: [id number]  Referring Provider:  Fidel Mcdonnell MD  Insurance: Other Generic Auto Insurance [facility]     Medical Diagnosis: DeQuervains Tenosynovis R M65.4      Rehab Codes: pain in hand M79.646,, pain in wrist M25.53,, stiffness in hand M25.64,, stiffness in wrist M25.63,, muscle weakness generalized M62.81, or history of falls Z91.81,  Onset Date: 10-1-20                Next Dr. Sonja Sarmiento: TBD  Visit# / total visits: ; Progress note for patient due at visit 12    Cancels/No Shows: 0/0    Subjective:    Pain:  [] Yes  [x] No Location: R wrist  Pain Rating: (0-10 scale) 2/10 at rest  Pain altered Tx:  [x] No  [] Yes  Action: ultrasound, ionto, massage   Pt Comments: Pt reports that putting weight on R wrist makes it hurt more. Objective:  Modalities:  Modality Flow Sheet:  START STOP Tx Modality   2021 Discontinue 6/3/21    Re-administered 21 Iontophoresis for decrease of local tissue inflammation and damage. Waveform: DC, Dosage: 40-80 mA min, Intensity: Up to 4-5 mA, Duration: 10-20 mins depending on dosage and intensity. Allergies discussed with pt and pt identifier confirmed by statement of full name and birthdate.     Treatments administered: 3/8     5-  Ultrasound: _.8__ W/cm2 x __8_ mins  Duty factor: _X_100%  __50%  __33% __20%  Head size:    __2_ cm  MHz: __1mHz  _X_3mHz  Location: base of R thumb     Hot Pack:     Cold Pack:       Precautions: NA  Exercises:  EXERCISE    REPS/     TIME  WEIGHT/    LEVEL COMMENTS   Soft tissue massage  8 minutes   Not completed this date- base of

## 2021-07-12 NOTE — FLOWSHEET NOTE
[x] [facility] &  Therapy  [address]  [phone]  F: [phone] [] [address]  [place] [address]   [address]  P: [phone]  F: [phone] [] [place]  [address]  P: [phone]  F: [phone] [] [address]  P: [phone]  F: [phone] [] [address]  [facility]   [address]   [place]: [phone]  F: [phone]      Physical Therapy Daily Treatment Note/ Progress Note    Date:  2021  Patient Name:  Fernanda Berg    :  1980  MRN: [id number]  Physician: Moses Leary MD                Insurance: [facility] 30 visits   Medical Diagnosis: Acute low back pain without sciatica, unspecified back pain laterality (M54.5 [ICD-10-CM]); Winged scapula of left side (M95.8 [ICD-10-CM])               Rehab Codes: M54.6, M54.5   Onset Date: 21                                 Next 's appt: N/A  Visit# / total visits: 10/12     Cancels/No Shows: 0/0    Subjective:    Pain:  [x] Yes  [] No Location: Left scapula   Pain Rating: (0-10 scale) 2/10  Pain altered Tx:  [] No  [] Yes  Action:  Comments: Patient reports improved neck, back pain and lower body pain. Objective:   Modalities:   Precautions:  Exercises:   Exercise Reps/ Time Weight/ Level x Comments   Elliptical 5 min  L3 x Patient defers    Standing    Wrist brace worn as needed.     Cybex Cables       Seated lat pull down  2x10x 6 pl x    Standing lat pull down  2x10x 3 pl  x    Standing row  2x10x  6 pl  x    Standing Single arm row 2x10x 3 pl x    Paloff Rotation [id number] 3 pl  x Used 45-45 rotation today   FW OH press  2x10x 15 lb  x    Standing lat stretch on black table  3x20\"             Power strides 2x15x ea 15 lb  x Hold weight on contralateral side of stepping leg   Goblet squats  2x15x ea 15 lb  x L knee popping    2 way hip ext /abd  20x ea Blue  x    Reverse lunge  HEP  x           Prone Row on TG  2x10x 10 lb     Ys, Ts, ext   3 lb            Mat        Bird dogs 10x      Cat/ camel  5x   difficult    SL bridges 10x ea             Corner Pec Stretch     Demonstrated for patient                  Other:      Treatment Charges: Mins Units   []  Modalities     [x]  Ther Exercise 55 4   []  Manual Therapy     []  Ther Activities     []  Aquatics     []  Vasocompression     []  Other     Total Treatment time 55 4       Assessment: [x] Progressing toward goals. Good tolerance to all exercise. Reviewed lunging technique, still struggling with revers lunges. [] No change. [x] Other:   [x] Patient would continue to benefit from skilled physical therapy services in order to: improve core strength and reduce back pain. STG/LTG  STG: (to be met in 8 treatments)   1. ? Pain: 3/10 low back pain with standing activity. Met  2. ? Strength: 4+/5 left scapular strength to improve coordination and reduce winging. Met  3. ? Function: Oswestry score of 24% impaired or better to improve ADLs. Met  4. Independent with Home Exercise Programs Met     LTG: (to be met in 12 treatments)  1. Patient reports increased standing tolerance due to improved back pain. 2. Patient is able to hold a low plank for 1 min. Pt. Education:  [x] Yes  [] No  [x] Reviewed Prior HEP/Ed  Educated on self-foam roll at home. Method of Education: [x] Verbal  [] Demo  [] Written   Comprehension of Education:  [x] Verbalizes understanding. [x] Demonstrates understanding. [x] Needs review. [] Demonstrates/verbalizes HEP/Ed previously given. Plan: [x] Continue current frequency toward long and short term goals.     [x] Specific Instructions for subsequent treatments: progress scapular stabilization, serratus punches       Time In:1500        Time Out: [facility] signed by:  Johnice Osler, PT

## 2021-07-15 ENCOUNTER — APPOINTMENT (OUTPATIENT)
Dept: PHYSICAL THERAPY | Age: 41
End: 2021-07-15
Payer: COMMERCIAL

## 2021-07-15 ENCOUNTER — APPOINTMENT (OUTPATIENT)
Dept: OCCUPATIONAL THERAPY | Age: 41
End: 2021-07-15
Payer: COMMERCIAL

## 2021-07-16 ENCOUNTER — HOSPITAL ENCOUNTER (EMERGENCY)
Age: 41
Discharge: HOME | End: 2021-07-16
Attending: EMERGENCY MEDICINE
Payer: COMMERCIAL

## 2021-07-16 VITALS
DIASTOLIC BLOOD PRESSURE: 83 MMHG | BODY MASS INDEX: 22.96 KG/M2 | HEART RATE: 88 BPM | OXYGEN SATURATION: 100 % | TEMPERATURE: 97.7 F | RESPIRATION RATE: 14 BRPM | SYSTOLIC BLOOD PRESSURE: 139 MMHG | WEIGHT: 160 LBS

## 2021-07-16 DIAGNOSIS — R10.84 GENERALIZED ABDOMINAL PAIN: Primary | ICD-10-CM

## 2021-07-16 LAB
ABSOLUTE EOS #: 0 K/UL (ref 0–0.4)
ABSOLUTE IMMATURE GRANULOCYTE: 0 K/UL (ref 0–0.3)
ABSOLUTE LYMPH #: 0.73 K/UL (ref 1–4.8)
ABSOLUTE MONO #: 0.24 K/UL (ref 0.1–0.8)
ALBUMIN SERPL-MCNC: 5.2 G/DL (ref 3.5–5.2)
ALBUMIN/GLOBULIN RATIO: 1.7 (ref 1–2.5)
ALP BLD-CCNC: 55 U/L (ref 40–129)
ALT SERPL-CCNC: 14 U/L (ref 5–41)
ANION GAP SERPL CALCULATED.3IONS-SCNC: 17 MMOL/L (ref 9–17)
AST SERPL-CCNC: 15 U/L
BASOPHILS # BLD: 0 % (ref 0–2)
BASOPHILS ABSOLUTE: 0 K/UL (ref 0–0.2)
BILIRUB SERPL-MCNC: 0.8 MG/DL (ref 0.3–1.2)
BUN BLDV-MCNC: 17 MG/DL (ref 6–20)
BUN/CREAT BLD: ABNORMAL (ref 9–20)
CALCIUM SERPL-MCNC: 10 MG/DL (ref 8.6–10.4)
CHLORIDE BLD-SCNC: 100 MMOL/L (ref 98–107)
CO2: 22 MMOL/L (ref 20–31)
CREAT SERPL-MCNC: 0.75 MG/DL (ref 0.7–1.2)
DIFFERENTIAL TYPE: ABNORMAL
EOSINOPHILS RELATIVE PERCENT: 0 % (ref 1–4)
GFR AFRICAN AMERICAN: >60 ML/MIN
GFR NON-AFRICAN AMERICAN: >60 ML/MIN
GFR SERPL CREATININE-BSD FRML MDRD: ABNORMAL ML/MIN/{1.73_M2}
GFR SERPL CREATININE-BSD FRML MDRD: ABNORMAL ML/MIN/{1.73_M2}
GLUCOSE BLD-MCNC: 181 MG/DL (ref 70–99)
HCT VFR BLD CALC: 46.3 % (ref 40.7–50.3)
HEMOGLOBIN: 15.9 G/DL (ref 13–17)
IMMATURE GRANULOCYTES: 0 %
LIPASE: 36 U/L (ref 13–60)
LYMPHOCYTES # BLD: 6 % (ref 24–44)
MCH RBC QN AUTO: 29.4 PG (ref 25.2–33.5)
MCHC RBC AUTO-ENTMCNC: 34.3 G/DL (ref 28.4–34.8)
MCV RBC AUTO: 85.7 FL (ref 82.6–102.9)
MONOCYTES # BLD: 2 % (ref 1–7)
MORPHOLOGY: NORMAL
NRBC AUTOMATED: 0 PER 100 WBC
PDW BLD-RTO: 12.7 % (ref 11.8–14.4)
PLATELET # BLD: 359 K/UL (ref 138–453)
PLATELET ESTIMATE: ABNORMAL
PMV BLD AUTO: 9.7 FL (ref 8.1–13.5)
POTASSIUM SERPL-SCNC: 3.8 MMOL/L (ref 3.7–5.3)
RBC # BLD: 5.4 M/UL (ref 4.21–5.77)
RBC # BLD: ABNORMAL 10*6/UL
SEG NEUTROPHILS: 92 % (ref 36–66)
SEGMENTED NEUTROPHILS ABSOLUTE COUNT: 11.13 K/UL (ref 1.8–7.7)
SODIUM BLD-SCNC: 139 MMOL/L (ref 135–144)
TOTAL PROTEIN: 8.3 G/DL (ref 6.4–8.3)
WBC # BLD: 12.1 K/UL (ref 3.5–11.3)
WBC # BLD: ABNORMAL 10*3/UL

## 2021-07-16 PROCEDURE — 2580000003 HC RX 258: Performed by: STUDENT IN AN ORGANIZED HEALTH CARE EDUCATION/TRAINING PROGRAM

## 2021-07-16 PROCEDURE — 99284 EMERGENCY DEPT VISIT MOD MDM: CPT

## 2021-07-16 PROCEDURE — 83690 ASSAY OF LIPASE: CPT

## 2021-07-16 PROCEDURE — 80053 COMPREHEN METABOLIC PANEL: CPT

## 2021-07-16 PROCEDURE — 85025 COMPLETE CBC W/AUTO DIFF WBC: CPT

## 2021-07-16 PROCEDURE — 96374 THER/PROPH/DIAG INJ IV PUSH: CPT

## 2021-07-16 PROCEDURE — 6360000002 HC RX W HCPCS: Performed by: STUDENT IN AN ORGANIZED HEALTH CARE EDUCATION/TRAINING PROGRAM

## 2021-07-16 PROCEDURE — 96375 TX/PRO/DX INJ NEW DRUG ADDON: CPT

## 2021-07-16 RX ORDER — DICYCLOMINE HYDROCHLORIDE 10 MG/1
10 CAPSULE ORAL EVERY 6 HOURS PRN
Qty: 20 CAPSULE | Refills: 0 | Status: SHIPPED | OUTPATIENT
Start: 2021-07-16

## 2021-07-16 RX ORDER — ONDANSETRON 4 MG/1
4 TABLET, ORALLY DISINTEGRATING ORAL EVERY 8 HOURS PRN
Qty: 20 TABLET | Refills: 0 | Status: ON HOLD | OUTPATIENT
Start: 2021-07-16 | End: 2021-09-20

## 2021-07-16 RX ORDER — 0.9 % SODIUM CHLORIDE 0.9 %
1000 INTRAVENOUS SOLUTION INTRAVENOUS ONCE
Status: COMPLETED | OUTPATIENT
Start: 2021-07-16 | End: 2021-07-16

## 2021-07-16 RX ORDER — DIPHENHYDRAMINE HYDROCHLORIDE 50 MG/ML
25 INJECTION INTRAMUSCULAR; INTRAVENOUS ONCE
Status: COMPLETED | OUTPATIENT
Start: 2021-07-16 | End: 2021-07-16

## 2021-07-16 RX ORDER — PROCHLORPERAZINE EDISYLATE 5 MG/ML
10 INJECTION INTRAMUSCULAR; INTRAVENOUS ONCE
Status: COMPLETED | OUTPATIENT
Start: 2021-07-16 | End: 2021-07-16

## 2021-07-16 RX ADMIN — SODIUM CHLORIDE 1000 ML: 9 INJECTION, SOLUTION INTRAVENOUS at 15:36

## 2021-07-16 RX ADMIN — PROCHLORPERAZINE EDISYLATE 10 MG: 5 INJECTION INTRAMUSCULAR; INTRAVENOUS at 15:36

## 2021-07-16 RX ADMIN — DIPHENHYDRAMINE HYDROCHLORIDE 25 MG: 50 INJECTION, SOLUTION INTRAMUSCULAR; INTRAVENOUS at 15:36

## 2021-07-16 ASSESSMENT — PAIN DESCRIPTION - DESCRIPTORS: DESCRIPTORS: SHARP

## 2021-07-16 ASSESSMENT — PAIN DESCRIPTION - ONSET: ONSET: ON-GOING

## 2021-07-16 ASSESSMENT — PAIN DESCRIPTION - LOCATION: LOCATION: ABDOMEN

## 2021-07-16 ASSESSMENT — PAIN DESCRIPTION - PAIN TYPE: TYPE: ACUTE PAIN

## 2021-07-16 ASSESSMENT — PAIN DESCRIPTION - PROGRESSION: CLINICAL_PROGRESSION: GRADUALLY WORSENING

## 2021-07-16 ASSESSMENT — PAIN SCALES - GENERAL: PAINLEVEL_OUTOF10: 10

## 2021-07-16 ASSESSMENT — PAIN DESCRIPTION - ORIENTATION: ORIENTATION: OTHER (COMMENT)

## 2021-07-16 NOTE — ED PROVIDER NOTES
[facility]     Emergency Department     Faculty Attestation    I performed a history and physical examination of the patient and discussed management with the resident. I have reviewed and agree with the residents findings including all diagnostic interpretations, and treatment plans as written at the time of my review. Any areas of disagreement are noted on the chart. I was personally present for the key portions of any procedures. I have documented in the chart those procedures where I was not present during the key portions. For Physician Assistant/ Nurse Practitioner cases/documentation I have personally evaluated this patient and have completed at least one if not all key elements of the E/M (history, physical exam, and MDM). Additional findings are as noted. This patient was evaluated in the Emergency Department for symptoms described in the history of present illness. The patient was evaluated in the context of the global COVID-19 pandemic, which necessitated consideration that the patient might be at risk for infection with the SARS-CoV-2 virus that causes COVID-19. Institutional protocols and algorithms that pertain to the evaluation of patients at risk for COVID-19 are in a state of rapid change based on information released by regulatory bodies including the CDC and federal and state organizations. These policies and algorithms were followed during the patient's care in the ED. (Please note that portions of this note were completed with a voice recognition program.  Efforts were made to edit the dictations but occasionally words are mis-transcribed.)    Pete Vivar.  Cong Rudd MD, [facility]  Attending Emergency Medicine Physician        Denise Lucas MD  07/16/21 2498

## 2021-07-16 NOTE — ED TRIAGE NOTES
Pt presents to ED from home c/o abdominal pain and nausea for 8 hours. Pt states it's gas pain and rates it currently as 10/10. Pt last bowel movement was this morning and it was normal. Pt denies vomiting blood, or blood in stool/urine. Pt says this issue has been ongoing for multiple weeks. Pt is A&OX4, placed on full monitor, IV established, labs drawn. Pt changed into gown and given warm blankets.

## 2021-07-16 NOTE — ED PROVIDER NOTES
[facility] ED  Emergency Department Encounter  EmergencyMedicine Resident     Pt Name:Jarrett Horner  MRN: [id number]  [place] 1980  Date of evaluation: 7/16/21  PCP:  Arcadio Ray MD    [address] [place] [address]       Chief Complaint   Patient presents with    Abdominal Pain       HISTORY OF PRESENT ILLNESS  (Location/Symptom, Timing/Onset, Context/Setting, Quality, Duration, Modifying Factors, Severity.)      Fernanda Berg is a 37 y.o. male who presents with diffuse abdominal pain. Patient states \"I am having gas pains and I know that is what it is but I cannot get the pain relieved at home\". Patient reports 1 episode of vomiting earlier today but states that otherwise he has had no associated symptoms. Reports that he is still passing flatus and had a normal bowel movement approximately 30 minutes prior to arrival to the emergency department. States that pain is located diffusely in his abdomen and does not radiate. PAST MEDICAL / SURGICAL / SOCIAL / FAMILY HISTORY      has a past medical history of Asthma. Denies any significant past surgical history    Social History     Socioeconomic History    Marital status: Single     Spouse name: Not on file    Number of children: Not on file    Years of education: Not on file    Highest education level: Not on file   Occupational History    Not on file   Tobacco Use    Smoking status: Never Smoker    Smokeless tobacco: Never Used   Substance and Sexual Activity    Alcohol use: No    Drug use: No    Sexual activity: Not on file   Other Topics Concern    Not on file   Social History Narrative    Not on file     Social Determinants of Health     Financial Resource Strain:     Difficulty of Paying Living Expenses:    Food Insecurity:     Worried About Running Out of Food in the Last Year:     [address] in the Last Year:    Transportation Needs:     Lack of Transportation (Medical):      Lack of Transportation (Non-Medical): Physical Activity:     Days of Exercise per Week:     Minutes of Exercise per Session:    Stress:     Feeling of Stress :    Social Connections:     Frequency of Communication with Friends and Family:     Frequency of Social Gatherings with Friends and Family:     Attends Shinto Services:     Active Member of Clubs or Organizations:     Attends Club or Organization Meetings:     Marital Status:    Intimate Partner Violence:     Fear of Current or Ex-Partner:     Emotionally Abused:     Physically Abused:     Sexually Abused:        History reviewed. No pertinent family history. Allergies:  Amoxicillin    Home Medications:  Prior to Admission medications    Medication Sig Start Date End Date Taking? Authorizing Provider   dicyclomine (BENTYL) 10 MG capsule Take 1 capsule by mouth every 6 hours as needed (cramps) 7/16/21  Yes Shasha Esquivel DO   ondansetron (ZOFRAN ODT) 4 MG disintegrating tablet Take 1 tablet by mouth every 8 hours as needed for Nausea 7/16/21  Yes Shasha Esquivel DO   azelastine (ASTELIN) 0.1 % nasal spray  6/30/21   Historical Provider, MD   fluticasone [facility]) 50 MCG/ACT nasal spray  7/1/21   Historical Provider, MD   polyethylene glycol (GLYCOLAX) 17 GM/SCOOP powder Take 17 g by mouth daily as needed (constipation) 7/7/21 8/6/21  Lizbet Coffman MD   diclofenac sodium (VOLTAREN) 1 % GEL Apply 2 g topically 2 times daily  Patient not taking: Reported on 7/7/2021 4/21/21   Catracho Jimenez MD   doxepin (SINEQUAN) 25 MG capsule Take 1 capsule by mouth nightly  Patient not taking: Reported on 7/7/2021 3/11/21   Katherine Blunt MD       REVIEW OF SYSTEMS    (2-9 systems for level 4, 10 or more for level 5)      Review of Systems   Constitutional: Negative for chills, fatigue and fever. Eyes: Negative for photophobia and visual disturbance. Respiratory: Negative for shortness of breath. Cardiovascular: Negative for chest pain and palpitations.    Gastrointestinal: Positive for abdominal pain and vomiting. Negative for blood in stool, constipation, diarrhea and nausea. Endocrine: Negative for polydipsia and polyuria. Genitourinary: Negative for dysuria and flank pain. Musculoskeletal: Negative for neck pain and neck stiffness. Skin: Negative for rash and wound. Neurological: Negative for weakness, light-headedness and headaches. Psychiatric/Behavioral: Negative for confusion. PHYSICAL EXAM   (up to 7 for level 4, 8 or more for level 5)      INITIAL VITALS:   /83   Pulse 88   Temp 97.7 °F (36.5 °C) (Oral)   Resp 14   Wt 160 lb (72.6 kg)   SpO2 100%   BMI 22.96 kg/m²     Physical Exam  Constitutional:       General: He is not in acute distress. HENT:      Head: Normocephalic and atraumatic. Cardiovascular:      Rate and Rhythm: Normal rate. Heart sounds: No murmur heard. No friction rub. No gallop. Pulmonary:      Effort: No respiratory distress. Breath sounds: No rhonchi or rales. Abdominal:      General: There is no distension. Tenderness: There is generalized abdominal tenderness. There is no guarding or rebound. Negative signs include Gatica's sign and McBurney's sign. Skin:     Findings: No erythema or rash.          DIFFERENTIAL  DIAGNOSIS     PLAN (LABS / IMAGING / EKG):  Orders Placed This Encounter   Procedures    CBC WITH AUTO DIFFERENTIAL    Comprehensive Metabolic Panel    LIPASE       MEDICATIONS ORDERED:  Orders Placed This Encounter   Medications    0.9 % sodium chloride bolus    prochlorperazine (COMPAZINE) injection 10 mg    diphenhydrAMINE (BENADRYL) injection 25 mg    dicyclomine (BENTYL) 10 MG capsule     Sig: Take 1 capsule by mouth every 6 hours as needed (cramps)     Dispense:  20 capsule     Refill:  0    ondansetron (ZOFRAN ODT) 4 MG disintegrating tablet     Sig: Take 1 tablet by mouth every 8 hours as needed for Nausea     Dispense:  20 tablet     Refill:  0       DDX: Ileus, gaseous distention, enteritis    DIAGNOSTIC RESULTS / EMERGENCY DEPARTMENT COURSE / MDM   LAB RESULTS:  Results for orders placed or performed during the hospital encounter of 07/16/21   CBC WITH AUTO DIFFERENTIAL   Result Value Ref Range    WBC 12.1 (H) 3.5 - 11.3 k/uL    RBC 5.40 4.21 - 5.77 m/uL    Hemoglobin 15.9 13.0 - 17.0 g/dL    Hematocrit 46.3 40.7 - 50.3 %    MCV 85.7 82.6 - 102.9 fL    MCH 29.4 25.2 - 33.5 pg    MCHC 34.3 28.4 - 34.8 g/dL    RDW 12.7 11.8 - 14.4 %    Platelets 846 697 - 182 k/uL    MPV 9.7 8.1 - 13.5 fL    NRBC Automated 0.0 0.0 per 100 WBC    Differential Type NOT REPORTED     WBC Morphology NOT REPORTED     RBC Morphology NOT REPORTED     Platelet Estimate NOT REPORTED     Immature Granulocytes 0 0 %    Seg Neutrophils 92 (H) 36 - 66 %    Lymphocytes 6 (L) 24 - 44 %    Monocytes 2 1 - 7 %    Eosinophils % 0 (L) 1 - 4 %    Basophils 0 0 - 2 %    Absolute Immature Granulocyte 0.00 0.00 - 0.30 k/uL    Segs Absolute 11.13 (H) 1.8 - 7.7 k/uL    Absolute Lymph # 0.73 (L) 1.0 - 4.8 k/uL    Absolute Mono # 0.24 0.1 - 0.8 k/uL    Absolute Eos # 0.00 0.0 - 0.4 k/uL    Basophils Absolute 0.00 0.0 - 0.2 k/uL    Morphology Normal    Comprehensive Metabolic Panel   Result Value Ref Range    Glucose 181 (H) 70 - 99 mg/dL    BUN 17 6 - 20 mg/dL    CREATININE 0.75 0.70 - 1.20 mg/dL    Bun/Cre Ratio NOT REPORTED 9 - 20    Calcium 10.0 8.6 - 10.4 mg/dL    Sodium 139 135 - 144 mmol/L    Potassium 3.8 3.7 - 5.3 mmol/L    Chloride 100 98 - 107 mmol/L    CO2 22 20 - 31 mmol/L    Anion Gap 17 9 - 17 mmol/L    Alkaline Phosphatase 55 40 - 129 U/L    ALT 14 5 - 41 U/L    AST 15 <40 U/L    Total Bilirubin 0.80 0.3 - 1.2 mg/dL    Total Protein 8.3 6.4 - 8.3 g/dL    Albumin 5.2 3.5 - 5.2 g/dL    Albumin/Globulin Ratio 1.7 1.0 - 2.5    GFR Non-African American >60 >60 mL/min    GFR African American >60 >60 mL/min    GFR Comment          GFR Staging NOT REPORTED    LIPASE   Result Value Ref Range    Lipase 36 13 - 60 U/L IMPRESSION/ ED Course: 45-year-old male no acute distress presenting with diffuse abdominal pain with one associated episode of vomiting. Doubt small bowel obstruction as patient has normal bowel sounds, passing flatus and reports normal bowel movement just prior to arrival to emergency department. Labs with mild leukocytosis of 12.1, CBC, CMP and lipase otherwise unremarkable. Vital signs within normal limits, no obvious signs or symptoms of infectious process. Nonsurgical/nonperitoneal abdomen on exam.  Treated symptomatically emergency department with IV fluids, Compazine, Benadryl. Patient reporting some improvement in symptoms on reevaluation. Tolerating p.o. intake well. Patient given outpatient follow-up instructions for gastroenterology as this is a chronic, recurrent problem for him. He verbalized understanding agree with discharge plan as well as ED return precautions      PROCEDURES:  None    CONSULTS:  None    CRITICAL CARE:  Please see attending note    FINAL IMPRESSION      1.  Generalized abdominal pain          DISPOSITION / PLAN     DISPOSITION Decision To Discharge 07/16/2021 04:34:32 PM      PATIENT REFERRED TO:  Jeannine Zelaya MD  [address] [place] [address]  [phone]    Schedule an appointment as soon as possible for a visit in 1 week  For re-evaluation    [address] [facility]  [address] [place] [address] Rd  [address] [facility] [address] [facility] [id number] [phone]  Schedule an appointment as soon as possible for a visit in 1 week  For re-evaluation      DISCHARGE MEDICATIONS:  Discharge Medication List as of 7/16/2021  4:35 PM      START taking these medications    Details   dicyclomine (BENTYL) 10 MG capsule Take 1 capsule by mouth every 6 hours as needed (cramps), Disp-20 capsule, R-0Print      ondansetron (ZOFRAN ODT) 4 MG disintegrating tablet Take 1 tablet by mouth every 8 hours as needed for Nausea, Disp-20 tablet, R-0Print             Donavan Dakin, DO  Emergency Medicine Resident    (Please note that portions of thisnote were completed with a voice recognition program.  Efforts were made to edit the dictations but occasionally words are mis-transcribed.)        Elian Barnett,   Resident  07/18/21 [address] [place] [address]   Resident  07/19/21 5404

## 2021-07-16 NOTE — ED NOTES
Bed: 34  Expected date:   Expected time:   Means of arrival:   Comments:  Ino Trinidad 37, RN  07/16/21 1197

## 2021-07-18 ASSESSMENT — ENCOUNTER SYMPTOMS
ABDOMINAL PAIN: 1
CONSTIPATION: 0
PHOTOPHOBIA: 0
BLOOD IN STOOL: 0
VOMITING: 1
SHORTNESS OF BREATH: 0
NAUSEA: 0
DIARRHEA: 0

## 2021-07-19 ENCOUNTER — TELEMEDICINE (OUTPATIENT)
Dept: FAMILY MEDICINE CLINIC | Age: 41
End: 2021-07-19
Payer: COMMERCIAL

## 2021-07-19 DIAGNOSIS — R10.84 GENERALIZED ABDOMINAL PAIN: Primary | ICD-10-CM

## 2021-07-19 PROCEDURE — 99442 PR PHYS/QHP TELEPHONE EVALUATION 11-20 MIN: CPT | Performed by: STUDENT IN AN ORGANIZED HEALTH CARE EDUCATION/TRAINING PROGRAM

## 2021-07-19 NOTE — PROGRESS NOTES
Dionte Gibson is a 37 y.o. male evaluated via telephone on 7/19/2021. Consent:  He and/or health care decision maker is aware that that he may receive a bill for this telephone service, depending on his insurance coverage, and has provided verbal consent to proceed: Yes      Documentation:  I communicated with the patient and/or health care decision maker about abdominal pain. Details of this discussion including any medical advice provided: Patient has been complaining of episodes of generalized abdominal pain for 2-3 months. He was seen in the ED 3 days ago complaining of severe generalized  Abdominal pain 10/10, vomited after taking gasex. He had diarrhea twice that morning. Eating doritos made it worse. He had bormal BM after the pain resolved. No nausea, fever, chills. Before incident frequent urination. No dysuria, urgency. 1. Generalized abdominal pain  Recurrent episodes of severe generalized abdominal pain that warranted patient to go to the ED.    - Jasmine Michael MD, Gastroenterology, [address]      I affirm this is a Patient Initiated Episode with a Patient who has not had a related appointment within my department in the past 7 days or scheduled within the next 24 hours. Patient identification was verified at the start of the visit: Yes    Total Time: minutes: 11-20 minutes    The visit was conducted pursuant to the emergency declaration under the [address] [place] [address], [address] [place] [address] authority and the [employer] General Act. Patient identification was verified, and a caregiver was present when appropriate. The patient was located in a state where the provider was credentialed to provide care.     Note: not billable if this call serves to triage the patient into an appointment for the relevant concern      Josefina Corbin MD

## 2021-07-26 ENCOUNTER — HOSPITAL ENCOUNTER (OUTPATIENT)
Dept: PHYSICAL THERAPY | Age: 41
Setting detail: THERAPIES SERIES
Discharge: HOME | End: 2021-07-26
Payer: COMMERCIAL

## 2021-07-26 ENCOUNTER — HOSPITAL ENCOUNTER (OUTPATIENT)
Dept: OCCUPATIONAL THERAPY | Age: 41
Setting detail: THERAPIES SERIES
Discharge: HOME | End: 2021-07-26
Payer: COMMERCIAL

## 2021-07-26 PROCEDURE — 97110 THERAPEUTIC EXERCISES: CPT

## 2021-07-26 NOTE — FLOWSHEET NOTE
[x] [facility]) [facility] CENTER &  Therapy  [address]  [phone]  F: [phone] [] [address]  [place]   [address]  P: [phone]  F: [phone] [] [place]  [address]  P: [phone]  F: [phone] [] [address]  P: [phone]  F: [phone] [] [address]  [facility]   [address]   [place]: [phone]  F: [phone]      Physical Therapy Daily Treatment Note/ Progress Note    Date:  2021  Patient Name:  Lori Millan    :  1980  MRN: [id number]  Physician: Sterling Manuel MD                Insurance: [facility] 30 visits   Medical Diagnosis: Acute low back pain without sciatica, unspecified back pain laterality (M54.5 [ICD-10-CM]); Winged scapula of left side (M95.8 [ICD-10-CM])               Rehab Codes: M54.6, M54.5   Onset Date: 21                                 Next 's appt: N/A  Visit# / total visits:      Cancels/No Shows: 0/0    Subjective:    Pain:  [x] Yes  [] No Location: Left scapula   Pain Rating: (0-10 scale) 0/10  Pain altered Tx:  [] No  [] Yes  Action:  Comments: Patient is doing well overall, limited to no back and shoulder pain. Patient has been compliant with HEP especially lower body work. Objective:   Modalities:   Precautions:  Exercises:   Exercise Reps/ Time Weight/ Level x Comments   Elliptical 5 min  L3 x Patient defers    Standing    Wrist brace worn as needed.     Cybex Cables       Seated lat pull down  2x10x 6 pl x    Standing lat pull down  2x10x 3 pl  x    Standing row  2x10x  6 pl   HEP   Standing Single arm row 2x10x 3 pl  HEP   Paloff Rotation [id number] 3 pl  x Used 45-45 rotation today   FW OH press  2x10x 15 lb   HEP   Standing lat stretch on previously given. Plan: [x] Continue current frequency toward long and short term goals.     [x] Specific Instructions for subsequent treatments:D/C HEP       Time II:7819        Time Out: 6086    Electronically signed by:  Andressa Vargas PT

## 2021-07-26 NOTE — FLOWSHEET NOTE
[x] [facility]       Occupational Therapy            1st floor       [address], [place]         Phone: [phone]       Fax: [phone] [] [facility]  [address], [place]  Phone: [phone]  Fax: [phone]      Occupational Therapy Daily Treatment Note    Date:  2021  Patient Name:  Jose Enrique Billy    :  1980  MRN: [id number]  Referring Provider:  Renee Anglin MD  Insurance: Other [employer] [facility]     Medical Diagnosis: DeQuervains Tenosynovis R M65.4      Rehab Codes: pain in hand M79.646,, pain in wrist M25.53,, stiffness in hand M25.64,, stiffness in wrist M25.63,, muscle weakness generalized M62.81, or history of falls Z91.81,  Onset Date: 10-1-20                Next Dr. Esperanza Pisano: TBD  Visit# / total visits: 10/12; Progress note for patient due at visit 12    Cancels/No Shows: 0/0    Subjective:    Pain:  [] Yes  [x] No Location: R wrist  Pain Rating: (0-10 scale) 0/10 at rest  Pain altered Tx:  [x] No  [] Yes  Action:  Pt Comments: \"I am not moving it when you ask so it is a zero\"    Objective:  Modalities:  Modality Flow Sheet:  START STOP Tx Modality   2021 Discontinue 6/3/21    Re-administered 21 Iontophoresis for decrease of local tissue inflammation and damage. Waveform: DC, Dosage: 40-80 mA min, Intensity: Up to 4-5 mA, Duration: 10-20 mins depending on dosage and intensity. Allergies discussed with pt and pt identifier confirmed by statement of full name and birthdate.     Treatments administered: 3/8     5- Declined on  Ultrasound: _.8__ W/cm2 x __8_ mins  Duty factor: _X_100%  __50%  __33% __20%  Head size:    __2_ cm  MHz: __1mHz  _X_3mHz  Location: base of R thumb     Hot Pack:     Cold Pack:       Precautions: NA  Exercises:  EXERCISE    REPS/     TIME  WEIGHT/    LEVEL COMMENTS   Soft tissue massage  8 minutes   Not completed this date- base of R thumb Theraputty   Strength  Finger flexion  Finger extension  Radial Dev  Ulnar Dev  Palmar Pinch  Lateral Pinch  Thumb ext  Three jaw char  Finger thumb ext   10 Pink (Light)   Not Completed   Pinch pins with foam blocks 1 series Red 2 pounds NOT Completed this date    Wrist Stretch  Flexion  Extension  Ulnar Deviation  Radial Deviation 5-10 seconds    10 reps  Completed (at home exercise)   Flexbar   strength  Wrist Flexion/Ext  Wrist pronation  Wrist supination  Wrist ulnar deviation  Wrist radial dev 10 reps Green (15 pounds) Completed   Dexteroll 3 series 2.5 pounds  completed   Pronation/Supination Bar 3 mins reps  1 pound  completed   Hand Exerciser 20 reps 25 pounds  completed   Wrist Maze 2 series  completed           Other: Pt reports having new abdominal pain at a 10/10 pain. Pt reports has been to the doctor and the hospital with no diagnosis and no relief at this time. Declined US this date. Treatment Charges: Mins Units   []  Modalities:  Iontophoresis         []  Ultrasound     [x]  Ther Exercise 46 3   []  Manual Therapy     []  Ther Activities     []  Orthotic fit/train     []  Orthotic recheck     []  Other     Total Treatment time 46      Assessment: [x] Progressing toward goals. Many rest breaks taken throughout session due to increasing pain/soreness of R thumb and wrist.  [] No change. [] Other     [x] Patient to continue  occupational therapy services in order to: Improve  functional /grasp, Strength and Complaint of pain in order to improve functional use of UE in ADL performance    Short Term Goals: (6  Treatments)  1. Decrease Pain: to 1/10 with pushing open doors (wrist extension) MET 0/10  2.  Increase strength (pounds);  a. R  strength to 70 for increased I with packing for move in the fall MET 73 pounds  b. R lateral pinch to 23 for increased I with use of camera for work related purposes MET 23 pounds  c. R 2 point pinch to 15 for increased I with typing for computer tasks ONGOING   d. R 3 jaw char for increased I with cooking tasks MET  3. Increase function:UE Functional Index Score 74 or more points to promote increased functional abilities NOT MET  4. Demo knowledge of fall prevention in 3 sessions. MET  5. Patient to be independent with home exercise program as demonstrated by performance with correct form without cues. MET     Long Term Goals: (  12  Treatments)  1. Tolerate ionto pending doctor approval MET  2. ? R  strength to 75 for increased I with packing/moving ONGOING     Patient Goals: ability to put weight on my palm without pain, the ability to carry things without pain as result. Pt. Education:  [] Yes  [x] No  [] Reviewed Prior HEP/Ed  Method of Education: [] Verbal  [] Demo  [] Written  Re:   Comprehension of Education:  [] Verbalizes understanding. [] Demonstrates understanding. [] Needs review. [] Demonstrates/verbalizes HEP/Ed previously given. Plan: [x] Continue current frequency toward short and long term goals.   [x] Specific Instructions for subsequent treatments: ultrasound, strengthening (when applicable), plan to discuss potential discharge from OT services with HEP due to increased progress meeting goals   [] Other:       Time In: 0805 Time Out: 2636        Electronically signed by:  GIULIA Bazzi/EVETTE/TRISTON

## 2021-08-03 ENCOUNTER — HOSPITAL ENCOUNTER (OUTPATIENT)
Age: 41
Setting detail: SPECIMEN
Discharge: HOME | End: 2021-08-03
Payer: COMMERCIAL

## 2021-08-03 ENCOUNTER — OFFICE VISIT (OUTPATIENT)
Dept: GASTROENTEROLOGY | Age: 41
End: 2021-08-03
Payer: COMMERCIAL

## 2021-08-03 VITALS — BODY MASS INDEX: 22.38 KG/M2 | WEIGHT: 156 LBS | DIASTOLIC BLOOD PRESSURE: 77 MMHG | SYSTOLIC BLOOD PRESSURE: 127 MMHG

## 2021-08-03 DIAGNOSIS — R10.13 EPIGASTRIC PAIN: ICD-10-CM

## 2021-08-03 DIAGNOSIS — R10.13 EPIGASTRIC PAIN: Primary | ICD-10-CM

## 2021-08-03 LAB
FOLATE: >20 NG/ML
TSH SERPL DL<=0.05 MIU/L-ACNC: 2.14 MIU/L (ref 0.3–5)
VITAMIN B-12: 1508 PG/ML (ref 232–1245)
VITAMIN D 25-HYDROXY: 28.4 NG/ML (ref 30–100)

## 2021-08-03 PROCEDURE — 99203 OFFICE O/P NEW LOW 30 MIN: CPT | Performed by: INTERNAL MEDICINE

## 2021-08-03 PROCEDURE — G8420 CALC BMI NORM PARAMETERS: HCPCS | Performed by: INTERNAL MEDICINE

## 2021-08-03 PROCEDURE — 1036F TOBACCO NON-USER: CPT | Performed by: INTERNAL MEDICINE

## 2021-08-03 PROCEDURE — G8427 DOCREV CUR MEDS BY ELIG CLIN: HCPCS | Performed by: INTERNAL MEDICINE

## 2021-08-03 ASSESSMENT — ENCOUNTER SYMPTOMS
EYES NEGATIVE: 1
ALLERGIC/IMMUNOLOGIC NEGATIVE: 1
GASTROINTESTINAL NEGATIVE: 1
RESPIRATORY NEGATIVE: 1

## 2021-08-03 NOTE — PROGRESS NOTES
Reason for Referral:  Abdominal pain. Mendez Herrera MD  [facility],  [address] [place] [address]    Chief Complaint   Patient presents with    New Patient     patient referred for abdominal pain     Abdominal Pain     Patient states pain started a month ago. Patient started having pain several hours after eating. Pain would decrease when sitting up versus laying down. He believes its gas pain. States Ice packs helped reduce pain. Epigastric region. States he felt like he needed to burp, but couldn't. Patient states he eats a very bland diet. HISTORY OF PRESENT ILLNESS: Mr.Anthony Raul Lucia is a 37 y.o. male with a past history remarkable for self diagnosed autism versus ADHD, recent emergency department visit for severe abdominal pain was given GI cocktail with outpatient follow-up, referred for evaluation of migrating abdominal pain and dyspepsia. Patient reports the pain appears to be bloating in nature however not associated with any significant episodes of intractable nausea or emesis. Denies any significant irregular bowel movements. Patient reports the pain and discomfort appears to be chronic in nature. Patient reports that he has a remote history of depression which he self treats with microdosing LSD. He does report that his use of LSD on previous occasions was, at times, associated with his severe abdominal pain and diarrhea. Intentional weight loss reported by the patient. No blood in stool. No abnormal eating patterns. Smoker: none   Drinking history: None  MicroLSD. Abdominal surgeries: none   Prior Colonoscopy: None   Prior EGD: Teenager--   FH of GI issues: Not known. Past Medical,Family, and Social History reviewed and does contribute to the patient presentingcondition. Patient's PMH/PSH,SH,PSYCH Hx, MEDs, ALLERGIES, and ROS were all reviewed and updated in the appropriate sections.     PAST MEDICAL HISTORY:  Past Medical History:   Diagnosis Date    Stress:     Feeling of Stress :    Social Connections:     Frequency of Communication with Friends and Family:     Frequency of Social Gatherings with Friends and Family:     Attends Cheondoism Services:     Active Member of Clubs or Organizations:     Attends Club or Organization Meetings:     Marital Status:    Intimate Partner Violence:     Fear of Current or Ex-Partner:     Emotionally Abused:     Physically Abused:     Sexually Abused:          REVIEW OF SYSTEMS: A 12-point review of systems was obtained and pertinent positives and negatives were listed below. REVIEW OF SYSTEMS:     Constitutional: No fever, no chills, no lethargy, no weakness. HEENT:  No headache, otalgia, itchy eyes, nasal discharge or sore throat. Cardiac:  No chest pain, dyspnea, orthopnea or PND. Chest:   No cough, phlegm or wheezing. Abdomen:      Detailed by MA   Neuro:  No focal weakness, abnormal movements or seizure like activity. Skin:   No rashes, no itching. :   No hematuria, no pyuria, no dysuria, no flank pain. Extremities:  No swelling or joint pains. ROS was otherwise negative    Review of Systems   Constitutional: Negative. HENT: Negative. Eyes: Negative. Respiratory: Negative. Cardiovascular: Negative. Gastrointestinal: Negative. Endocrine: Negative. Genitourinary: Negative. Musculoskeletal: Negative. Skin: Negative. Allergic/Immunologic: Negative. Neurological: Negative. Hematological: Negative. Psychiatric/Behavioral: Negative. All other systems reviewed and are negative. PHYSICAL EXAMINATION: Vital signs reviewed per the nursing documentation. /77   Wt 156 lb (70.8 kg)   BMI 22.38 kg/m²   Body mass index is 22.38 kg/m². Physical Exam    Physical Exam   Constitutional: Patient is oriented to person, place, and time. Patient appears well-developed and well-nourished. HENT:   Head: Normocephalic and atraumatic.    Eyes: Pupils are equal, round, and reactive to light. EOM are normal.   Neck: Normal range of motion. Neck supple. No JVD present. No tracheal deviation present. No thyromegaly present. Cardiovascular: Normal rate, regular rhythm, normal heart sounds and intact distal pulses. Pulmonary/Chest: Effort normal and breath sounds normal. No stridor. No respiratory distress. He has no wheezes. He has no rales. He exhibits no tenderness. Abdominal: Soft. Bowel sounds are normal. He exhibits no distension and no mass. There is no tenderness. There is no rebound and no guarding. No hernia. Musculoskeletal: Normal range of motion. Lymphadenopathy:    Patient has no cervical adenopathy. Neurological: Patient is alert and oriented to person, place, and time. Psychiatric: Patient has a normal mood and affect. Patient behavior is normal.       LABORATORY DATA: Reviewed  Lab Results   Component Value Date    WBC 12.1 (H) 07/16/2021    HGB 15.9 07/16/2021    HCT 46.3 07/16/2021    MCV 85.7 07/16/2021     07/16/2021     07/16/2021    K 3.8 07/16/2021     07/16/2021    CO2 22 07/16/2021    BUN 17 07/16/2021    CREATININE 0.75 07/16/2021    LABALBU 5.2 07/16/2021    BILITOT 0.80 07/16/2021    ALKPHOS 55 07/16/2021    AST 15 07/16/2021    ALT 14 07/16/2021         Lab Results   Component Value Date    RBC 5.40 07/16/2021    HGB 15.9 07/16/2021    MCV 85.7 07/16/2021    MCH 29.4 07/16/2021    MCHC 34.3 07/16/2021    RDW 12.7 07/16/2021    MPV 9.7 07/16/2021    BASOPCT 0 07/16/2021    LYMPHSABS 0.73 (L) 07/16/2021    MONOSABS 0.24 07/16/2021    NEUTROABS 11.13 (H) 07/16/2021    EOSABS 0.00 07/16/2021    BASOSABS 0.00 07/16/2021         DIAGNOSTIC TESTING:     No results found.        IMPRESSION: Mr.Anthony Ana Ferreira is a 37 y.o. male with a past history remarkable for self diagnosed autism versus ADHD, recent emergency department visit for severe abdominal pain was given GI cocktail with outpatient follow-up, referred for evaluation of migrating abdominal pain and dyspepsia. Patient reports the pain appears to be bloating in nature however not associated with any significant episodes of intractable nausea or emesis. Denies any significant irregular bowel movements. Patient reports the pain and discomfort appears to be chronic in nature. Patient reports that he has a remote history of depression which he self treats with microdosing LSD. He does report that his use of LSD on previous occasions was, at times, associated with his severe abdominal pain and diarrhea. Intentional weight loss reported by the patient. No blood in stool. No abnormal eating patterns. Assessment  1. Epigastric pain        PLAN:    1) severe migrating and intermittent dyspepsia-like abdominal pain-benign abdominal exam today. No evidence of abdominal tenderness or bloating. Possible association with the patient's microdosing of LSD for his self diagnosed depression. Advised to stop his illicit drug use. Maintain adequate dietary habits. We will continue with Zofran as needed. Will check for celiac disease panel, food comprehensive panel, IBD panel. 2) recommend dairy free trial for minimum 7 2 hours. Adequate hydration is recommended. 3) patient is leaving the state in approximately 5 weeks and is uncertain whether or not continuity can be maintained. Recommend possible virtual visit as a follow-up to discuss initial serological test.      Thank you for allowing me to participate in the care of Mr. Eyal Granados. For any further questions please do not hesitate to contact me. I have reviewed and agree with the MA/LPN ROS please refer to their documentation from today's encounter on a separate note.      Cassy Stevens MD, MPH   [facility] Gastroenterology  Office #: [phone]          this note is created with the assistance of a speech recognition program.  While intending to generate a document that actually reflects the content of the visit, the document can still have some errors including those of syntax and sound a like substitutions which may escape proof reading. It such instances, actual meaning can be extrapolated by contextual diversion.

## 2021-08-04 ENCOUNTER — HOSPITAL ENCOUNTER (OUTPATIENT)
Dept: PHYSICAL THERAPY | Age: 41
Setting detail: THERAPIES SERIES
Discharge: HOME | End: 2021-08-04
Payer: COMMERCIAL

## 2021-08-04 ENCOUNTER — HOSPITAL ENCOUNTER (OUTPATIENT)
Dept: OCCUPATIONAL THERAPY | Age: 41
Setting detail: THERAPIES SERIES
Discharge: HOME | End: 2021-08-04
Payer: COMMERCIAL

## 2021-08-04 LAB
GLIADIN DEAMINIDATED PEPTIDE AB IGA: 0.9 U/ML
GLIADIN DEAMINIDATED PEPTIDE AB IGG: 0.5 U/ML
IGA: 123 MG/DL (ref 70–400)
TISSUE TRANSGLUTAMINASE IGA: 0.2 U/ML

## 2021-08-04 PROCEDURE — 97110 THERAPEUTIC EXERCISES: CPT

## 2021-08-04 NOTE — FLOWSHEET NOTE
[x] [facility] &  Therapy  [address]  [phone]  F: [phone] [] [address]  [place]   [address]  P: [phone]  F: [phone] [] [address] &  Therapy  [address]  P: [phone]  F: [phone] [] [address]  P: [phone]  F: [phone] [] [address]  [facility]   [address]   [place]: [phone]  F: [phone]      Physical Therapy Daily Treatment Note/ Progress Note    Date:  2021  Patient Name:  Cloteal Marking    :  1980  MRN: [id number]  Physician: Sterling Manuel MD                Insurance: [facility] 30 visits   Medical Diagnosis: Acute low back pain without sciatica, unspecified back pain laterality (M54.5 [ICD-10-CM]); Winged scapula of left side (M95.8 [ICD-10-CM])               Rehab Codes: M54.6, M54.5   Onset Date: 21                                 Next 's appt: N/A  Visit# / total visits:      Cancels/No Shows: 0/0    Subjective:    Pain:  [x] Yes  [] No Location: Left scapula   Pain Rating: (0-10 scale) 0/10  Pain altered Tx:  [] No  [] Yes  Action:  Comments: Patient is doing well overall still getting occasional scapular pain. Objective:   Modalities:   Precautions:  Exercises:   Exercise Reps/ Time Weight/ Level x Comments   Elliptical 5 min  L3 x Patient defers    Standing    Wrist brace worn as needed.     Cybex Cables       Seated lat pull down  2x10x 6 pl x    Standing lat pull down  2x10x 3 pl  x    Standing row  2x10x  6 pl   HEP   Standing Single arm row 2x10x 3 pl  HEP   Paloff Rotation [id number] 3 pl  x Used 45-45 rotation today   FW OH press  2x10x 15 lb   HEP   Standing lat stretch on black table  3x20\"             Power strides 2x15x ea 15 lb   Hold weight on contralateral side of stepping leg   Goblet squats  2x15x ea 15 lb   L knee popping    2 way hip ext /abd  20x ea Blue      Lunge Matrix 10x   Discussed Form           Prone Row on TG  2x10x 10 lb     Ys, Ts, ext  2x15x 4 lb            Mat        Bird dogs 10x      Cat/ camel  5x   difficult    SL bridges 10x ea      Bridges with band  10x      Corner Pec Stretch     Demonstrated for patient                  Other: Patient requested to hold exercises that he is already completing at home. Treatment Charges: Mins Units   []  Modalities     [x]  Ther Exercise 40 3   []  Manual Therapy     []  Ther Activities     []  Aquatics     []  Vasocompression     []  Other     Total Treatment time 40 3       Assessment: [x] Progressing toward goals. See same date discharge note. [] No change. [x] Other:   [x] Patient would continue to benefit from skilled physical therapy services in order to: improve core strength and reduce back pain. STG/LTG  STG: (to be met in 8 treatments)   1. ? Pain: 3/10 low back pain with standing activity. Met  2. ? Strength: 4+/5 left scapular strength to improve coordination and reduce winging. Met  3. ? Function: Oswestry score of 24% impaired or better to improve ADLs. Met  4. Independent with Home Exercise Programs Met     LTG: (to be met in 12 treatments)  1. Patient reports increased standing tolerance due to improved back pain. Met  2. Patient is able to hold a low plank for 1 min. Pt. Education:  [x] Yes  [] No  [x] Reviewed Prior HEP/Ed  Educated on self-foam roll at home. Method of Education: [x] Verbal  [] Demo  [] Written   Comprehension of Education:  [x] Verbalizes understanding. [x] Demonstrates understanding. [x] Needs review. [] Demonstrates/verbalizes HEP/Ed previously given. Access Code: [email or web address]  URL: [email or web address]  Date: 08/04/2021  Prepared by: Susie Locke ( added to D/C HEP) and issued new resistance bands. Standing [employer] with Resistance - 1 x daily - 7 x weekly - 3 sets - 10 reps  Standing High Row with Resistance - 1 x daily - 7 x weekly - 3 sets - 10 reps  Standing Shoulder Horizontal Abduction with Anchored Resistance - 1 x daily - 7 x weekly - 3 sets - 10 reps  Shoulder Flexion with Anterior Anchored Resistance - 1 x daily - 7 x weekly - 3 sets - 10 reps  Shoulder Flexion Serratus Activation with Resistance - 1 x daily - 7 x weekly - 3 sets - 10 reps        Plan: [x] Continue current frequency toward long and short term goals.     [x] Specific Instructions for subsequent treatments:D/C HEP       Time In:2:55 pm         Time Out: 3:40 pm    Electronically signed by:  Jimmy Allen PT

## 2021-08-04 NOTE — FLOWSHEET NOTE
NA  Exercises:  EXERCISE    REPS/     TIME  WEIGHT/    LEVEL COMMENTS   Soft tissue massage  8 minutes   Completed this date- base of R thumb   Theraputty   Strength  Finger flexion  Finger extension  Radial Dev  Ulnar Dev  Palmar Pinch  Lateral Pinch  Thumb ext  Three jaw char  Finger thumb ext   10 Pink (Light)   Not Completed   Pinch pins with foam blocks 1 series Green 4 pounds Completed     Wrist Stretch  Flexion  Extension  Ulnar Deviation  Radial Deviation 5-10 seconds    10 reps  Completed (at home exercise)   Flexbar   strength  Wrist Flexion/Ext  Wrist pronation  Wrist supination  Wrist ulnar deviation  Wrist radial dev 10 reps Green (15 pounds) Completed   Dexteroll 3 series 2.5 pounds  completed   Pronation/Supination Bar 3 mins   1 pound  completed   Hand Exerciser 20 reps 25 pounds  completed   Wrist Maze 2 series  Not completed           Other:  NA     Treatment Charges: Mins Units   []  Modalities:  Iontophoresis         []  Ultrasound     [x]  Ther Exercise 47 3   []  Manual Therapy     []  Ther Activities     []  Orthotic fit/train     []  Orthotic recheck     []  Other     Total Treatment time 47      Assessment: [x] Progressing toward goals. Declined ultrasound this date. Declined to complete wrist maze. Pt states has not been completing putty exercises because they don't feel productive and it's too much work/messy to work with. Pt states using a stress ball and other items to work on increasing wrist/hand strength. Pt does not feel resistive pinch pin activity is a productive exercise. Pt was offered the blue pinch pin to complete activity, however declined, stating it wouldn't make a difference. Pt completed above exercises without complaints. Pt with 1 remaining treatment session, however did not want to schedule this date due to becoming busy with packing and [place]. pt states will call clinic if time becomes available for last treatment session. [] No change. [] Other     [x] Patient to continue  occupational therapy services in order to: Improve  functional /grasp, Strength and Complaint of pain in order to improve functional use of UE in ADL performance    Short Term Goals: (6  Treatments)  1. Decrease Pain: to 1/10 with pushing open doors (wrist extension) MET 0/10  2. Increase strength (pounds);  a. R  strength to 70 for increased I with packing for move in the fall MET 73 pounds  b. R lateral pinch to 23 for increased I with use of camera for work related purposes MET 23 pounds  c. R 2 point pinch to 15 for increased I with typing for computer tasks ONGOING   d. R 3 jaw char for increased I with cooking tasks MET  3. Increase function:UE Functional Index Score 74 or more points to promote increased functional abilities NOT MET  4. Demo knowledge of fall prevention in 3 sessions. MET  5. Patient to be independent with home exercise program as demonstrated by performance with correct form without cues. MET     Long Term Goals: (  12  Treatments)  1. Tolerate ionto pending doctor approval MET  2. ? R  strength to 75 for increased I with packing/moving ONGOING     Patient Goals: ability to put weight on my palm without pain, the ability to carry things without pain as result. Pt. Education:  [] Yes  [x] No  [] Reviewed Prior HEP/Ed  Method of Education: [] Verbal  [] Demo  [] Written  Re:   Comprehension of Education:  [] Verbalizes understanding. [] Demonstrates understanding. [] Needs review. [] Demonstrates/verbalizes HEP/Ed previously given. Plan: [x] Continue current frequency toward short and long term goals.   [x] Specific Instructions for subsequent treatments: strengthening (when applicable)   [] Other:       Time In: 5104  Time Out: 4920        Electronically signed by:  GIULIA Markham/TRISTON

## 2021-08-04 NOTE — DISCHARGE SUMMARY
[x] [facility]  [address]  [phone]  F: [phone] [] [address]  [place] [address]   [address]  P: [phone]  F: [phone] [] [place]  [address]  P: [phone]  F: [phone] [] [address]  P: [phone]  F: [phone] [] [address]  [facility]   [address]   [place]: [phone]  F: [phone]      Physical Therapy Discharge Note    Date: 2021      Patient: Beverly Alfonso  : 1980  MRN: [id number]    [address] Nery Larsen MD                Insurance: [facility] 30 visits   Medical Diagnosis: Acute low back pain without sciatica, unspecified back pain laterality (M54.5 [ICD-10-CM]); Winged scapula of left side (M95.8 [ICD-10-CM])               Rehab Codes: M54.6, M54.5   Onset Date: 21                                 Next 's appt: N/A  Visit# / total visits:                                 Cancels/No Shows: 0/0  Date of initial visit:  21               Date of final visit: 21    Subjective:    Pain:  [x]? Yes  []? No   Location: Left scapula             Pain Rating: (0-10 scale) 0/10  Pain altered Tx:  []? No  []? Yes  Action:  Comments: Patient is doing well overall still getting occasional scapular pain. Objective:  Test Measurements:  MMT: 4+/5 scapular strength bilaterally    Assessment:  Patient has improved scapular winging and has reduced his low back pain to a manageable level. Recommend continued HEP 2-3x/week to maintain functional improvements. STG/LTG  STG: (to be met in 8 treatments)   1. ? Pain: 3/10 low back pain with standing activity. Met  2. ? Strength: 4+/5 left scapular strength to improve coordination and reduce winging. Met  3. ? Function: Oswestry score of 24% impaired or better to improve ADLs. Met  4. Independent with Home Exercise Programs Met     LTG: (to be met in 12 treatments)  1. Patient reports increased standing tolerance due to improved back pain. Met  2. Patient is able to hold a low plank for 1 min.      Treatment to Date:  [x] Therapeutic Exercise    [] Modalities:  [] Therapeutic Activity     [] Ultrasound  [] Electrical Stimulation  [] Gait Training     [] Massage       [] Lumbar/Cervical Traction  [] Neuromuscular Re-education [] Cold/hotpack [] Iontophoresis: 4 mg/mL  [] Instruction in Home Exercise Program                     Dexamethasone Sodium  [] Manual Therapy             Phosphate 40-80 mAmin  [] Aquatic Therapy                   [] Vasocompression/    [] Other:             Game Ready    Discharge Status:     [] Pt recovered from conditions. Treatment goals were met. [x] Pt received maximum benefit. No further therapy indicated at this time. [] Pt to continue exercise/home instructions independently. [] Therapy interrupted due to:    [] Pt has 2 or more no shows/cancels, is discontinued per our policy. [] Pt has completed prescribed number of treatment sessions. [] Other:     Electronically signed by Jyoti Low PT on 8/4/2021 at 3:46 PM    If you have any questions or concerns, please don't hesitate to call.   Thank you for your referral.

## 2021-08-05 LAB
ALLERGEN BARLEY IGE: <0.1 KU/L (ref 0–0.34)
ALLERGEN BEEF: <0.1 KU/L (ref 0–0.34)
ALLERGEN CABBAGE IGE: <0.1 KU/L (ref 0–0.34)
ALLERGEN CARROT IGE: <0.1 KU/L (ref 0–0.34)
ALLERGEN CHICKEN IGE: <0.1 KU/L (ref 0–0.34)
ALLERGEN CODFISH IGE: <0.1 KU/L (ref 0–0.34)
ALLERGEN CORN IGE: <0.1 KU/L (ref 0–0.34)
ALLERGEN COW MILK IGE: <0.1 KU/L (ref 0–0.34)
ALLERGEN CRAB IGE: <0.1 KU/L (ref 0–0.34)
ALLERGEN EGG WHITE IGE: <0.1 KU/L (ref 0–0.34)
ALLERGEN GRAPE IGE: <0.1 KU/L (ref 0–0.34)
ALLERGEN LETTUCE IGE: <0.1 KU/L (ref 0–0.34)
ALLERGEN NAVY BEAN: <0.1 KU/L (ref 0–0.34)
ALLERGEN OAT: <0.1 KU/L (ref 0–0.34)
ALLERGEN ORANGE IGE: <0.1 KU/L (ref 0–0.34)
ALLERGEN PEANUT (F13) IGE: <0.1 KU/L (ref 0–0.34)
ALLERGEN PEPPER C. ANNUUM IGE: <0.1 KU/L (ref 0–0.34)
ALLERGEN PORK: <0.1 KU/L (ref 0–0.34)
ALLERGEN RICE IGE: <0.1 KU/L (ref 0–0.34)
ALLERGEN RYE IGE: <0.1 KU/L (ref 0–0.34)
ALLERGEN SOYBEAN IGE: <0.1 KU/L (ref 0–0.34)
ALLERGEN TOMATO IGE: <0.1 KU/L (ref 0–0.34)
ALLERGEN TUNA IGE: <0.1 KU/L (ref 0–0.34)
ALLERGEN WHEAT IGE: <0.1 KU/L (ref 0–0.34)
H PYLORI BREATH TEST: NEGATIVE
IGE: 81 IU/ML
POTATO, IGE: <0.1 KU/L (ref 0–0.34)
SHRIMP: <0.1 KU/L (ref 0–0.34)

## 2021-08-11 LAB — IBD PANEL: NORMAL

## 2021-08-11 NOTE — PROGRESS NOTES
I have reviewed and discussed key elements of [address] [place] [address] Drive with the resident including plan of care and follow up and agree with the care vesta plan.

## 2021-08-23 NOTE — DISCHARGE SUMMARY
[x] [facility]       Occupational Therapy             1st floor       [address], [place]         Phone: [phone]       Fax: [phone] [] [facility]  Occupational Therapy  [address], [place]  Phone: [phone]  Fax: [phone]         Occupational Therapy Discharge Note    Date: 2021      Patient: Carly Lofton  : 1980  MRN: [id number]    Referring Maryann Swann MD  Insurance: Other [employer] [facility]     Medical Diagnosis: DeQuervains Tenosynovis R M65.4      Rehab Codes: pain in hand M79.646,, pain in wrist M25.53,, stiffness in hand M25.64,, stiffness in wrist M25.63,, muscle weakness generalized M62.81, or history of falls Z91.81,  Onset Date: 10-1-20                Next Dr. Maikel Powell  Visit# / total visits: ; Cancels/No shows:   Date of initial visit: 21                Date of final visit: 21      Subjective:  Pain:  []? Yes  [x]? No   Location: R wrist         Pain Rating: (0-10 scale) 0/10 at rest  Pain altered Tx:  [x]? No  []? Yes  Action: none   Pt Comments: \"the wrist pain has been a little worse than the last time. \" Pt reports aching when wakening in the morning. Pt denies completing any new activities that could be causing increased wrist pain/soreness. Objective:  Upper Extremity Functional Index  Current Functional Level:  66/80  functionally impaired as measured with the Upper Extremity Functional Index Survey.  0-80 scale, with 80 = no Deficits(The UEFI model does not provide any specific cut off points that could classify the upper limb disability degree, however, a minimal detectable change of 9 points is provided. This means that for improvement or deterioration to be considered, between two subsequent evaluations, the scores must differ by at least 9 points.)     Sensibility: Normal    Edema: None      Skin Color: Normal   Skin/Scar condition Intact     Problems:      Pain, ROM, Strength and Falls: History or Risk of                STRENGTH (Measured in pounds)                pounds RIGHT LEFT    73 inc 8 62   Lateral pinch 23 inc 2.5 20   2 point pinch 14 inc 1 14   3 jaw pinch 17 inc 1 14      The affected extremity is 0 % weaker than the unaffected extremity. (affected score/unaffected score, take the total and subtract from 100)      Assessment:  Short Term Goals: (6  Treatments)  1. Decrease Pain: to 1/10 with pushing open doors (wrist extension) MET 0/10  2. Increase strength (pounds);  a. R  strength to 70 for increased I with packing for move in the fall MET 73 pounds  b. R lateral pinch to 23 for increased I with use of camera for work related purposes MET 23 pounds  c. R 2 point pinch to 15 for increased I with typing for computer tasks ONGOING   d. R 3 jaw char for increased I with cooking tasks MET  3. Increase function:UE Functional Index Score 74 or more points to promote increased functional abilities NOT MET  4. Demo knowledge of fall prevention in 3 sessions. MET  5. Patient to be independent with home exercise program as demonstrated by performance with correct form without cues. MET     Long Term Goals: (  12  Treatments)  1. Tolerate ionto pending doctor approval MET  2. ? R  strength to 75 for increased I with packing/moving ONGOING    Treatment to Date:  [x] Therapeutic Exercise    [x] Modalities:  [] Therapeutic Activity    [x] Ultrasound  [] Electrical Stimulation  [] Ortho refit/check             [] Massage   [] Fluidotherapy  [] Neuromuscular Re-education [] Cold/hotpack [x] Iontophoresis: 4 mg/mL  [x] Instruction in Home Exercise Program                     Dexamethasone Sodium  [] Manual Therapy             Phosphate 40-80 mAmin          [] Paraffin        [] Other:    Discharge Status:     [] Pt recovered from conditions. Treatment goals were met. [x] Pt received maximum benefit.  No further therapy indicated at this time. [x] Pt to continue exercise/home instructions independently. [] Therapy interrupted due to:    [] Pt has 2 or more no shows/cancels, is discontinued per our policy. [] Pt has completed prescribed number of treatment sessions. [x] Other: above measurements taken from last progress note on 6-28-21, pt did not call clinic to schedule last remaining OT treatment session, as pt stated he would. Pt had met most goals at that time, but completed another 4 treatment sessions. Electronically signed by: Marylene Gavel, OTR/L    If you have any questions or concerns, please don't hesitate to call.   Thank you for your referral.

## 2021-09-17 ENCOUNTER — OFFICE VISIT (OUTPATIENT)
Dept: GASTROENTEROLOGY | Age: 41
End: 2021-09-17
Payer: COMMERCIAL

## 2021-09-17 VITALS — BODY MASS INDEX: 21.52 KG/M2 | SYSTOLIC BLOOD PRESSURE: 116 MMHG | DIASTOLIC BLOOD PRESSURE: 70 MMHG | WEIGHT: 150 LBS

## 2021-09-17 DIAGNOSIS — R10.13 EPIGASTRIC PAIN: Primary | ICD-10-CM

## 2021-09-17 PROCEDURE — G8427 DOCREV CUR MEDS BY ELIG CLIN: HCPCS | Performed by: INTERNAL MEDICINE

## 2021-09-17 PROCEDURE — 99213 OFFICE O/P EST LOW 20 MIN: CPT | Performed by: INTERNAL MEDICINE

## 2021-09-17 PROCEDURE — 1036F TOBACCO NON-USER: CPT | Performed by: INTERNAL MEDICINE

## 2021-09-17 PROCEDURE — G8420 CALC BMI NORM PARAMETERS: HCPCS | Performed by: INTERNAL MEDICINE

## 2021-09-17 RX ORDER — PANTOPRAZOLE SODIUM 40 MG/1
40 TABLET, DELAYED RELEASE ORAL
Qty: 90 TABLET | Refills: 1 | Status: SHIPPED | OUTPATIENT
Start: 2021-09-17

## 2021-09-17 ASSESSMENT — ENCOUNTER SYMPTOMS
ALLERGIC/IMMUNOLOGIC NEGATIVE: 1
RESPIRATORY NEGATIVE: 1
EYES NEGATIVE: 1
ABDOMINAL PAIN: 1

## 2021-09-17 NOTE — PROGRESS NOTES
Session:    Stress:     Feeling of Stress :    Social Connections:     Frequency of Communication with Friends and Family:     Frequency of Social Gatherings with Friends and Family:     Attends Evangelical Services:     Active Member of Clubs or Organizations:     Attends Club or Organization Meetings:     Marital Status:    Intimate Partner Violence:     Fear of Current or Ex-Partner:     Emotionally Abused:     Physically Abused:     Sexually Abused:        REVIEW OF SYSTEMS: A 12-point review of systems was obtained and pertinent positives and negatives were listed below. REVIEW OF SYSTEMS:     Constitutional: No fever, no chills, no lethargy, no weakness. HEENT:  No headache, otalgia, itchy eyes, nasal discharge or sore throat. Cardiac:  No chest pain, dyspnea, orthopnea or PND. Chest:   No cough, phlegm or wheezing. Abdomen:      Detailed by MA   Neuro:  No focal weakness, abnormal movements or seizure like activity. Skin:   No rashes, no itching. :   No hematuria, no pyuria, no dysuria, no flank pain. Extremities:  No swelling or joint pains. ROS was otherwise negative    Review of Systems   Constitutional: Negative. HENT: Negative. Eyes: Negative. Respiratory: Negative. Cardiovascular: Negative. Gastrointestinal: Positive for abdominal pain. Endocrine: Negative. Genitourinary: Negative. Musculoskeletal: Negative. Skin: Negative. Allergic/Immunologic: Negative. Neurological: Negative. Hematological: Negative. Psychiatric/Behavioral: Negative. All other systems reviewed and are negative. PHYSICAL EXAMINATION: Vital signs reviewed per the nursing documentation. /70   Wt 150 lb (68 kg)   BMI 21.52 kg/m²   Body mass index is 21.52 kg/m². Physical Exam    Physical Exam   Constitutional: Patient is oriented to person, place, and time. Patient appears well-developed and well-nourished. HENT:   Head: Normocephalic and atraumatic. Eyes: Pupils are equal, round, and reactive to light. EOM are normal.   Neck: Normal range of motion. Neck supple. No JVD present. No tracheal deviation present. No thyromegaly present. Cardiovascular: Normal rate, regular rhythm, normal heart sounds and intact distal pulses. Pulmonary/Chest: Effort normal and breath sounds normal. No stridor. No respiratory distress. He has no wheezes. He has no rales. He exhibits no tenderness. Abdominal: Soft. Bowel sounds are normal. He exhibits no distension and no mass. There is no tenderness. There is no rebound and no guarding. No hernia. Musculoskeletal: Normal range of motion. Lymphadenopathy:    Patient has no cervical adenopathy. Neurological: Patient is alert and oriented to person, place, and time. Psychiatric: Patient has a normal mood and affect. Patient behavior is normal.       LABORATORY DATA: Reviewed  Lab Results   Component Value Date    WBC 12.1 (H) 07/16/2021    HGB 15.9 07/16/2021    HCT 46.3 07/16/2021    MCV 85.7 07/16/2021     07/16/2021     07/16/2021    K 3.8 07/16/2021     07/16/2021    CO2 22 07/16/2021    BUN 17 07/16/2021    CREATININE 0.75 07/16/2021    LABALBU 5.2 07/16/2021    BILITOT 0.80 07/16/2021    ALKPHOS 55 07/16/2021    AST 15 07/16/2021    ALT 14 07/16/2021         Lab Results   Component Value Date    RBC 5.40 07/16/2021    HGB 15.9 07/16/2021    MCV 85.7 07/16/2021    MCH 29.4 07/16/2021    MCHC 34.3 07/16/2021    RDW 12.7 07/16/2021    MPV 9.7 07/16/2021    BASOPCT 0 07/16/2021    LYMPHSABS 0.73 (L) 07/16/2021    MONOSABS 0.24 07/16/2021    NEUTROABS 11.13 (H) 07/16/2021    EOSABS 0.00 07/16/2021    BASOSABS 0.00 07/16/2021         DIAGNOSTIC TESTING:     No results found.            IMPRESSION: Mr.Anthony Jessica Vo is a 37 y.o. male with a past history remarkable for self diagnosed autism versus ADHD, recent emergency department visit for severe abdominal pain was given GI cocktail with outpatient follow-up, referred for evaluation of migrating abdominal pain and dyspepsia. Patient reports the pain appears to be bloating in nature however not associated with any significant episodes of intractable nausea or emesis. Denies any significant irregular bowel movements. Patient reports the pain and discomfort appears to be chronic in nature. Patient reports that he has a remote history of depression which he self treats with microdosing LSD. He does report that his use of LSD on previous occasions was, at times, associated with his severe abdominal pain and diarrhea. Intentional weight loss reported by the patient. No blood in stool. No abnormal eating patterns. Assessment  1. Epigastric pain        PLAN:    1) severe migrating intermittent dyspepsia symptoms-serological work-up has been negative thus far. No signs of active bleeding. Will place patient on Protonix 40 mg daily and plan for diagnostic upper endoscopy. Patient advised to stop illicit drugs and avoiding dietary triggers. 2) follow-up after procedure        Thank you for allowing me to participate in the care of Mr. Chris Crane. For any further questions please do not hesitate to contact me. I have reviewed and agree with the ROS entered by the MA/LPN from today's encounter documented in a separate note. Brielle Olguin MD, MPH   [facility]  Office #: [phone]    this note is created with the assistance of a speech recognition program.  While intending to generate a document that actually reflects the content of the visit, the document can still have some errors including those of syntax and sound a like substitutions which may escape proof reading. It such instances, actual meaning can be extrapolated by contextual diversion.

## 2021-09-20 ENCOUNTER — ANESTHESIA EVENT (OUTPATIENT)
Dept: ENDOSCOPY | Age: 41
End: 2021-09-20
Payer: COMMERCIAL

## 2021-09-20 ENCOUNTER — ANESTHESIA (OUTPATIENT)
Dept: ENDOSCOPY | Age: 41
End: 2021-09-20
Payer: COMMERCIAL

## 2021-09-20 ENCOUNTER — HOSPITAL ENCOUNTER (OUTPATIENT)
Age: 41
Setting detail: OUTPATIENT SURGERY
Discharge: HOME | End: 2021-09-20
Attending: INTERNAL MEDICINE | Admitting: INTERNAL MEDICINE
Payer: COMMERCIAL

## 2021-09-20 VITALS
HEART RATE: 60 BPM | SYSTOLIC BLOOD PRESSURE: 110 MMHG | DIASTOLIC BLOOD PRESSURE: 60 MMHG | HEIGHT: 70 IN | RESPIRATION RATE: 12 BRPM | TEMPERATURE: 97.7 F | WEIGHT: 150 LBS | OXYGEN SATURATION: 100 % | BODY MASS INDEX: 21.47 KG/M2

## 2021-09-20 VITALS
RESPIRATION RATE: 20 BRPM | OXYGEN SATURATION: 100 % | SYSTOLIC BLOOD PRESSURE: 106 MMHG | DIASTOLIC BLOOD PRESSURE: 57 MMHG

## 2021-09-20 PROCEDURE — 7100000011 HC PHASE II RECOVERY - ADDTL 15 MIN: Performed by: INTERNAL MEDICINE

## 2021-09-20 PROCEDURE — 3700000000 HC ANESTHESIA ATTENDED CARE: Performed by: INTERNAL MEDICINE

## 2021-09-20 PROCEDURE — 2580000003 HC RX 258

## 2021-09-20 PROCEDURE — 7100000010 HC PHASE II RECOVERY - FIRST 15 MIN: Performed by: INTERNAL MEDICINE

## 2021-09-20 PROCEDURE — 2500000003 HC RX 250 WO HCPCS

## 2021-09-20 PROCEDURE — 88305 TISSUE EXAM BY PATHOLOGIST: CPT

## 2021-09-20 PROCEDURE — 2709999900 HC NON-CHARGEABLE SUPPLY: Performed by: INTERNAL MEDICINE

## 2021-09-20 PROCEDURE — 3609012400 HC EGD TRANSORAL BIOPSY SINGLE/MULTIPLE: Performed by: INTERNAL MEDICINE

## 2021-09-20 PROCEDURE — 76937 US GUIDE VASCULAR ACCESS: CPT

## 2021-09-20 PROCEDURE — 6360000002 HC RX W HCPCS

## 2021-09-20 PROCEDURE — 2580000003 HC RX 258: Performed by: INTERNAL MEDICINE

## 2021-09-20 PROCEDURE — 88342 IMHCHEM/IMCYTCHM 1ST ANTB: CPT

## 2021-09-20 PROCEDURE — 43239 EGD BIOPSY SINGLE/MULTIPLE: CPT | Performed by: INTERNAL MEDICINE

## 2021-09-20 RX ORDER — LIDOCAINE HYDROCHLORIDE 10 MG/ML
INJECTION, SOLUTION EPIDURAL; INFILTRATION; INTRACAUDAL; PERINEURAL PRN
Status: DISCONTINUED | OUTPATIENT
Start: 2021-09-20 | End: 2021-09-20 | Stop reason: SDUPTHER

## 2021-09-20 RX ORDER — SODIUM CHLORIDE 9 MG/ML
INJECTION, SOLUTION INTRAVENOUS ONCE
Status: COMPLETED | OUTPATIENT
Start: 2021-09-20 | End: 2021-09-20

## 2021-09-20 RX ORDER — SODIUM CHLORIDE 9 MG/ML
INJECTION, SOLUTION INTRAVENOUS CONTINUOUS PRN
Status: DISCONTINUED | OUTPATIENT
Start: 2021-09-20 | End: 2021-09-20 | Stop reason: SDUPTHER

## 2021-09-20 RX ORDER — PROPOFOL 10 MG/ML
INJECTION, EMULSION INTRAVENOUS PRN
Status: DISCONTINUED | OUTPATIENT
Start: 2021-09-20 | End: 2021-09-20 | Stop reason: SDUPTHER

## 2021-09-20 RX ADMIN — SODIUM CHLORIDE: 9 INJECTION, SOLUTION INTRAVENOUS at 11:33

## 2021-09-20 RX ADMIN — LIDOCAINE HYDROCHLORIDE 50 MG: 10 INJECTION, SOLUTION EPIDURAL; INFILTRATION; INTRACAUDAL; PERINEURAL at 11:41

## 2021-09-20 RX ADMIN — SODIUM CHLORIDE: 9 INJECTION, SOLUTION INTRAVENOUS at 11:38

## 2021-09-20 RX ADMIN — PROPOFOL 250 MG: 10 INJECTION, EMULSION INTRAVENOUS at 11:41

## 2021-09-20 ASSESSMENT — PAIN SCALES - GENERAL
PAINLEVEL_OUTOF10: 0
PAINLEVEL_OUTOF10: 0

## 2021-09-20 NOTE — H&P
History and Physical    Pt Name: Santa Santos  MRN: [id number]  YOB: 1980  Date of evaluation: 9/20/2021  Primary Care Physician: John Corbett MD    SUBJECTIVE:   History of Chief Complaint:    Santa Santos is a 37 y.o. male who is scheduled today for EGD. He reports c/o epigastric pain, bloating that is intermittent and initially first noticed in July 2021. He describes this as pressure to his diaphragm and right ribs, throbbing and has awoken him from his sleep. He reports this has occurred twice this month and that his last episode of pain was 9/14/21. He denies n/v or bowel movement issues. He denies weight loss. He reports prior EGD as a teen for acid reflux. He was adopted. Allergies  is allergic to amoxicillin. Medications  Prior to Admission medications    Medication Sig Start Date End Date Taking? Authorizing Provider   pantoprazole (PROTONIX) 40 MG tablet Take 1 tablet by mouth every morning (before breakfast) 9/17/21  Yes Erin Alexander MD   azelastine (ASTELIN) 0.1 % nasal spray  6/30/21  Yes Historical Provider, MD   fluticasone (FLONASE) 50 MCG/ACT nasal spray  7/1/21  Yes Historical Provider, MD   dicyclomine (BENTYL) 10 MG capsule Take 1 capsule by mouth every 6 hours as needed (cramps) 7/16/21   Ravi Divine, DO   diclofenac sodium (VOLTAREN) 1 % GEL Apply 2 g topically 2 times daily 4/21/21   Terri Boucher MD   doxepin [facility]) 25 MG capsule Take 1 capsule by mouth nightly 3/11/21   Azalia Guerrero MD     Past Medical History    has a past medical history of Abdominal pain, Autism, History of COVID-19, History of depression, and Sinus disease. Past Surgical History   has a past surgical history that includes Nasal sinus surgery (Bilateral) and Upper gastrointestinal endoscopy. Social History   reports that he has never smoked. He has never used smokeless tobacco.   reports no history of alcohol use. reports no history of drug use.   Marital Status single  Children none  Occupation , will be moving to [place] next week. Family History  Family Status   Relation Name Status    Mother       family history includes Diabetes in his mother. He was adopted. Review of Systems:  CONSTITUTIONAL:   negative for fevers, chills, fatigue and malaise    EYES:   negative for double vision, blurred vision and photophobia    HEENT:   negative for tinnitus, epistaxis and sore throat     RESPIRATORY:   negative for cough, shortness of breath, wheezing     CARDIOVASCULAR:   negative for chest pain, palpitations, syncope, edema     GASTROINTESTINAL:   negative for nausea, vomiting  +epigastric pain   GENITOURINARY:   negative for incontinence     MUSCULOSKELETAL:   negative for neck or back pain     NEUROLOGICAL:   Negative for weakness and tingling  negative for headaches and dizziness     PSYCHIATRIC:   negative for anxiety  +autism per the pt     OBJECTIVE:   VITALS:  height is 5' 10\" (1.778 m) and weight is 150 lb (68 kg). His blood pressure is 114/77 and his pulse is 76. His respiration is 21 and oxygen saturation is 100%. CONSTITUTIONAL:alert & oriented x 3, no acute distress. Calm, flat quiet affect. Pleasant. SKIN:  Warm and dry, no rashes on exposed areas of skin   HEAD:  Normocephalic, atraumatic   EYES: PERRL. EOMs intact. EARS:  Equal bilaterally, no edema or thickening, skin is intact without lumps or lesions. No discharge. Hearing grossly WNL. NOSE:  Nares patent. No rhinorrhea   MOUTH/THROAT:  Mucous membranes moist, tongue is pink, uvula midline, teeth appear to be intact  NECK:supple, no lymphadenopathy  LUNGS: Respirations even and non-labored. Clear to auscultation bilaterally, no wheezes, rales, or rhonchi. CARDIOVASCULAR: Regular rate and rhythm, no murmurs/rubs/gallops   ABDOMEN: soft, non-tender, non-distended, bowel sounds active x 4   EXTREMITIES: No edema bilateral lower extremities.   No varicosities bilateral lower extremities. NEUROLOGIC: CN II-XII are grossly intact. Gait not assessed.   IMPRESSIONS:   Epigastric pain  PLANS:   EGD    PITER GARCIA CNP   Electronically signed 9/20/2021 at 11:01 AM

## 2021-09-20 NOTE — ANESTHESIA POSTPROCEDURE EVALUATION
POST- ANESTHESIA EVALUATION       Pt Name: Sarah Reyes  MRN: [id number]  [place]: 1980  Date of evaluation: 9/20/2021  Time:  1:00 PM      /60   Pulse 60   Temp 97.7 °F (36.5 °C) (Infrared)   Resp 12   Ht 5' 10\" (1.778 m)   Wt 150 lb (68 kg)   SpO2 100%   BMI 21.52 kg/m²      Consciousness Level  Awake  Cardiopulmonary Status  Stable  Pain Adequately Treated YES  Nausea / Vomiting  NO  Adequate Hydration  YES  Anesthesia Related Complications NONE      Electronically signed by Rossi Ramos MD on 9/20/2021 at 1:00 PM       Department of Anesthesiology  Postprocedure Note    Patient: Sarah Reyes  MRN: [id number]  YOB: 1980  Date of evaluation: 9/20/2021  Time:  1:00 PM     Procedure Summary     Date: 09/20/21 Room / Location: [address] [place] [address]    Anesthesia Start: 1138 Anesthesia Stop: 0227    Procedure: EGD BIOPSY (N/A ) Diagnosis: (EPIGASTRIC PAIN)    Surgeons: Courtney Kraus MD Responsible Provider: Rossi Ramos MD    Anesthesia Type: MAC ASA Status: 2          Anesthesia Type: MAC    Salvatore Phase I:      Salvatore Phase II: Salvatore Score: 10    Last vitals: Reviewed and per EMR flowsheets.        Anesthesia Post Evaluation

## 2021-09-20 NOTE — OP NOTE
Operative Note      Patient: Sim Lara  YOB: 1980  MRN: [id number]    Date of Procedure: 9/20/2021    Pre-Op Diagnosis: EPIGASTRIC PAIN    Post-Op Diagnosis: Mild gastritis, slightly irregular z-line       Procedure(s):  EGD ESOPHAGOGASTRODUODENOSCOPY    Surgeon(s):  Anca Camacho MD    Assistant:   First Assistant: Tyra Robles RN    Anesthesia: Monitor Anesthesia Care    Estimated Blood Loss (mL): Minimal    Complications: None    Specimens:   ID Type Source Tests Collected by Time Destination   A : Gastric Bx Tissue Stomach SURGICAL PATHOLOGY Anca Camacho MD 9/20/2021 1145    B : GE [place]unction Bx R/O Barrent's Tissue Esophagus SURGICAL PATHOLOGY Anca Camacho MD 9/20/2021 1148        Implants:  * No implants in log *      Drains: * No LDAs found *          [facility]    [facility] ENDOSCOPY     EGD    PROCEDURE DATE: 09/20/21    REFERRING PHYSICIAN: No ref. provider found     PRIMARY CARE PROVIDER: Shania Patrick MD    ATTENDING PHYSICIAN: Anca Camacho MD     HISTORY: Mr. Sim Lara is a 37 y.o. male who presents to the [facility] Endoscopy unit for upper endoscopy. The patient's clinical history is remarkable for recurrent episodes of dyspepsia, evaluation for PUD. He is currently medically stable and appropriate for the planned procedure. PREOPERATIVE DIAGNOSIS: Dyspepsia. PROCEDURES:   1) Transoral Upper Endoscopy with cold biopsy. POSTOPERATIVE DIAGNOSIS:     1) Slightly irregular z-line, 1.5 cm short segment Betts's vs small hiatal hernia. GEJ cold biopsies taken to evaluate further  2) Mild non-specific erythema in the gastric body and antrum. No ulcerations, no erosions. 3) Normal appearing duodenal mucosa     MEDICATIONS:   MAC per anesthesia     EBL:  <10cc    INSTRUMENT: Olympus GIF-H180  flexible Gastroscope.      PREPARATION: The nature and character of the procedure as well as risks, benefits, and alternatives were discussed with the patient and informed consent was obtained. Complications were said to include, but were not limited to: medication allergy, medication reaction, cardiovascular and respiratory problems, bleeding, perforation, infection, and/or missed diagnosis. Following arrival in the endoscopy room, the patient was placed in the left lateral decubitus position and final time-out accomplished in the presence of the nursing staff. Baseline vital signs were obtained and reviewed, and IV sedation was subsequently initiated. FINDINGS:   Esophagus: The esophagus was inspected to the Z-line. The endoscopic exam showed slightly irregular z-line. Stomach: The stomach was inspected in both forward and retroflex fashion and was appropriately distensible. The cardia, fundus, incisura, antrum and pylorus were identified via direct visualization. The endoscopic exam showed mild gastritis. Duodenum: The proximal small bowel was inspected through the bulb, sweep, and second portion of the duodenum. The endoscopic exam showed normal appearing duodenal mocosa. IMPRESSION:    1) Slightly irregular z-line, 1.5 cm short segment Betts's vs small hiatal hernia. GEJ cold biopsies taken to evaluate further  2) Mild non-specific erythema in the gastric body and antrum. No ulcerations, no erosions. 3) Normal appearing duodenal mucosa         RECOMMENDATIONS:   1) Follow up with referring provider, as previously scheduled. 2) Follow up path in GI clinic. Continue anti-acid therapy and dietary modifications as previously recommended. [facility]     This note is created with the assistance of a speech recognition program.  While intending to generate a document that actually reflects the content of the visit, the document can still have some errors including those of syntax and sound a like substitutions which may escape proof reading.   It such instances, actual meaning can be extrapolated by contextual diversion. The patient was counseled at length about the risks of petr Covid-19 during their perioperative period and any recovery window from their procedure. The patient was made aware that petr Covid-19  may worsen their prognosis for recovering from their procedure  and lend to a higher morbidity and/or mortality risk. All material risks, benefits, and reasonable alternatives including postponing the procedure were discussed. The patient DOES wish to proceed with the procedure at this time.           Electronically signed by Tommy Land MD on 9/20/2021 at 11:49 AM

## 2021-09-20 NOTE — PROGRESS NOTES
DISCHARGE CRITERIA MET INSTRUCTIONS GIVEN  IV DISCONTINUED SITE CLEAR ABDOMEN SOFT UP WITH STEADY GAIT

## 2021-09-22 LAB — SURGICAL PATHOLOGY REPORT: NORMAL

## 2021-09-23 ENCOUNTER — TELEPHONE (OUTPATIENT)
Dept: GASTROENTEROLOGY | Age: 41
End: 2021-09-23

## 2021-09-23 DIAGNOSIS — R10.9 ABDOMINAL PAIN, UNSPECIFIED ABDOMINAL LOCATION: Primary | ICD-10-CM

## 2021-09-23 NOTE — TELEPHONE ENCOUNTER
Patient called requesting to review procedure results . Reviewed results with patient. Patient requested antibiotics because he is still having nightly symptoms and thinks it is a bacteria. Writer advised patient to take his protonix nightly instead of AM.    Patient believes he has a bacteria in his stomach. Told patient I would discuss this with  to see if he wants to order GI panel/stool samples to rule this out prior to patient leaving town in 7 days.

## 2022-07-12 NOTE — FLOWSHEET NOTE
[x] [facility]) [facility]  [address]    [phone]  F: [phone]   [] [address]  [facility] [address]   [address]  P: [phone]  F: [phone]  [] [address] &  Therapy  [address]  P: [phone]  F: [phone] [] [address] [employer]  P: [phone]  F: [phone]  [] [address]  [facility]   [address]   [place]: [phone]  F: [phone]   [] [facility]  [address] [facility] [address]  [place]: [phone]   F: [phone]     Physical Therapy Cancel/No Show note    Date: 6/10/2021  Patient: Sanjeev Fisher  : 1980  MRN: [id number]    Cancels/No Shows to date:     For today's appointment patient:    [x]  Cancelled    [] Rescheduled appointment    [] No-show     Reason given by patient:    []  Patient ill    []  Conflicting appointment    [] No transportation      [] Conflict with work    [x] No reason given    [] Weather related    [] [id number]    [] Other:      Comments:        [x] Next appointment was confirmed    Electronically signed by: Gurinder Perez PTA Arava Counseling:  Patient counseled regarding adverse effects of Arava including but not limited to nausea, vomiting, abnormalities in liver function tests. Patients may develop mouth sores, rash, diarrhea, and abnormalities in blood counts. The patient understands that monitoring is required including LFTs and blood counts.  There is a rare possibility of scarring of the liver and lung problems that can occur when taking methotrexate. Persistent nausea, loss of appetite, pale stools, dark urine, cough, and shortness of breath should be reported immediately. Patient advised to discontinue Arava treatment and consult with a physician prior to attempting conception. The patient will have to undergo a treatment to eliminate Arava from the body prior to conception.

## (undated) DEVICE — FORCEPS BX L240CM WRK CHN 2.8MM STD CAP W/ NDL MIC MESH